# Patient Record
Sex: FEMALE | Race: WHITE | NOT HISPANIC OR LATINO | Employment: OTHER | URBAN - METROPOLITAN AREA
[De-identification: names, ages, dates, MRNs, and addresses within clinical notes are randomized per-mention and may not be internally consistent; named-entity substitution may affect disease eponyms.]

---

## 2017-01-16 RX ORDER — ANASTROZOLE 1 MG/1
TABLET ORAL
Qty: 30 TABLET | Refills: 2 | Status: SHIPPED | OUTPATIENT
Start: 2017-01-16 | End: 2017-04-12 | Stop reason: SDUPTHER

## 2017-02-02 DIAGNOSIS — M85.80 OSTEOPENIA: ICD-10-CM

## 2017-02-02 DIAGNOSIS — C50.411 MALIGNANT NEOPLASM OF UPPER-OUTER QUADRANT OF RIGHT FEMALE BREAST (HCC): ICD-10-CM

## 2017-02-06 ENCOUNTER — OFFICE VISIT (OUTPATIENT)
Dept: ONCOLOGY | Facility: CLINIC | Age: 67
End: 2017-02-06

## 2017-02-06 ENCOUNTER — LAB (OUTPATIENT)
Dept: LAB | Facility: HOSPITAL | Age: 67
End: 2017-02-06

## 2017-02-06 ENCOUNTER — INFUSION (OUTPATIENT)
Dept: ONCOLOGY | Facility: HOSPITAL | Age: 67
End: 2017-02-06

## 2017-02-06 ENCOUNTER — TRANSCRIBE ORDERS (OUTPATIENT)
Dept: ONCOLOGY | Facility: CLINIC | Age: 67
End: 2017-02-06

## 2017-02-06 VITALS
WEIGHT: 169.2 LBS | HEIGHT: 64 IN | SYSTOLIC BLOOD PRESSURE: 130 MMHG | RESPIRATION RATE: 16 BRPM | DIASTOLIC BLOOD PRESSURE: 70 MMHG | TEMPERATURE: 97.6 F | HEART RATE: 74 BPM | BODY MASS INDEX: 28.89 KG/M2

## 2017-02-06 DIAGNOSIS — C50.411 MALIGNANT NEOPLASM OF UPPER-OUTER QUADRANT OF RIGHT FEMALE BREAST (HCC): ICD-10-CM

## 2017-02-06 DIAGNOSIS — M85.80 OSTEOPENIA: Primary | ICD-10-CM

## 2017-02-06 DIAGNOSIS — C50.411 MALIGNANT NEOPLASM OF UPPER-OUTER QUADRANT OF RIGHT FEMALE BREAST (HCC): Primary | ICD-10-CM

## 2017-02-06 DIAGNOSIS — R23.2 HOT FLASHES: ICD-10-CM

## 2017-02-06 DIAGNOSIS — M85.80 OSTEOPENIA: ICD-10-CM

## 2017-02-06 DIAGNOSIS — D75.1 SECONDARY ERYTHROCYTOSIS: ICD-10-CM

## 2017-02-06 LAB
ALBUMIN SERPL-MCNC: 4.4 G/DL (ref 3.5–5.2)
ALBUMIN/GLOB SERPL: 1.5 G/DL (ref 1.1–2.4)
ALP SERPL-CCNC: 69 U/L (ref 38–116)
ALT SERPL W P-5'-P-CCNC: 17 U/L (ref 0–33)
ANION GAP SERPL CALCULATED.3IONS-SCNC: 9.5 MMOL/L
AST SERPL-CCNC: 17 U/L (ref 0–32)
BASOPHILS # BLD AUTO: 0.05 10*3/MM3 (ref 0–0.1)
BASOPHILS NFR BLD AUTO: 0.8 % (ref 0–1.1)
BILIRUB SERPL-MCNC: 0.4 MG/DL (ref 0.1–1.2)
BUN BLD-MCNC: 16 MG/DL (ref 6–20)
BUN/CREAT SERPL: 20 (ref 7.3–30)
CALCIUM SPEC-SCNC: 9.6 MG/DL (ref 8.5–10.2)
CHLORIDE SERPL-SCNC: 103 MMOL/L (ref 98–107)
CO2 SERPL-SCNC: 30.5 MMOL/L (ref 22–29)
CREAT BLD-MCNC: 0.8 MG/DL (ref 0.6–1.1)
DEPRECATED RDW RBC AUTO: 46.6 FL (ref 37–49)
EOSINOPHIL # BLD AUTO: 0.24 10*3/MM3 (ref 0–0.36)
EOSINOPHIL NFR BLD AUTO: 3.6 % (ref 1–5)
ERYTHROCYTE [DISTWIDTH] IN BLOOD BY AUTOMATED COUNT: 13.9 % (ref 11.7–14.5)
GFR SERPL CREATININE-BSD FRML MDRD: 72 ML/MIN/1.73
GLOBULIN UR ELPH-MCNC: 2.9 GM/DL (ref 1.8–3.5)
GLUCOSE BLD-MCNC: 121 MG/DL (ref 74–124)
HCT VFR BLD AUTO: 46.7 % (ref 34–45)
HGB BLD-MCNC: 14.2 G/DL (ref 11.5–14.9)
IMM GRANULOCYTES # BLD: 0.02 10*3/MM3 (ref 0–0.03)
IMM GRANULOCYTES NFR BLD: 0.3 % (ref 0–0.5)
LYMPHOCYTES # BLD AUTO: 1.77 10*3/MM3 (ref 1–3.5)
LYMPHOCYTES NFR BLD AUTO: 26.8 % (ref 20–49)
MAGNESIUM SERPL-MCNC: 2.1 MG/DL (ref 1.8–2.5)
MCH RBC QN AUTO: 27.5 PG (ref 27–33)
MCHC RBC AUTO-ENTMCNC: 30.4 G/DL (ref 32–35)
MCV RBC AUTO: 90.3 FL (ref 83–97)
MONOCYTES # BLD AUTO: 0.44 10*3/MM3 (ref 0.25–0.8)
MONOCYTES NFR BLD AUTO: 6.7 % (ref 4–12)
NEUTROPHILS # BLD AUTO: 4.09 10*3/MM3 (ref 1.5–7)
NEUTROPHILS NFR BLD AUTO: 61.8 % (ref 39–75)
NRBC BLD MANUAL-RTO: 0 /100 WBC (ref 0–0)
PHOSPHATE SERPL-MCNC: 3.3 MG/DL (ref 2.5–4.5)
PLATELET # BLD AUTO: 276 10*3/MM3 (ref 150–375)
PMV BLD AUTO: 9.1 FL (ref 8.9–12.1)
POTASSIUM BLD-SCNC: 4.5 MMOL/L (ref 3.5–4.7)
PROT SERPL-MCNC: 7.3 G/DL (ref 6.3–8)
RBC # BLD AUTO: 5.17 10*6/MM3 (ref 3.9–5)
SODIUM BLD-SCNC: 143 MMOL/L (ref 134–145)
WBC NRBC COR # BLD: 6.61 10*3/MM3 (ref 4–10)

## 2017-02-06 PROCEDURE — 36415 COLL VENOUS BLD VENIPUNCTURE: CPT

## 2017-02-06 PROCEDURE — 83735 ASSAY OF MAGNESIUM: CPT

## 2017-02-06 PROCEDURE — 85025 COMPLETE CBC W/AUTO DIFF WBC: CPT

## 2017-02-06 PROCEDURE — 25010000002 DENOSUMAB 60 MG/ML SOLUTION: Performed by: INTERNAL MEDICINE

## 2017-02-06 PROCEDURE — 80053 COMPREHEN METABOLIC PANEL: CPT

## 2017-02-06 PROCEDURE — 99213 OFFICE O/P EST LOW 20 MIN: CPT | Performed by: INTERNAL MEDICINE

## 2017-02-06 PROCEDURE — 84100 ASSAY OF PHOSPHORUS: CPT

## 2017-02-06 PROCEDURE — 96372 THER/PROPH/DIAG INJ SC/IM: CPT | Performed by: INTERNAL MEDICINE

## 2017-02-06 RX ORDER — ATORVASTATIN CALCIUM 80 MG/1
80 TABLET, FILM COATED ORAL DAILY
COMMUNITY
End: 2017-04-10 | Stop reason: SDUPTHER

## 2017-02-06 RX ADMIN — DENOSUMAB 60 MG: 60 INJECTION SUBCUTANEOUS at 10:25

## 2017-02-06 NOTE — PROGRESS NOTES
prolia supportive plan was inadvertently completed prior to being released- new order was entered per GEO Goel

## 2017-02-06 NOTE — PROGRESS NOTES
Subjective:     Reason for follow up:   1. Stage IA, multifocal invasive ductal carcinoma of the right breast; both estrogen receptor positive and progesterone receptor positive, HER-2/roya negative.    * Status post lumpectomy on 11/13/2014 with negative sentinel lymph nodes, status post adjuvant radiation therapy, completed in March 2015.    * Adjuvant Arimidex initiated March 2015, to be continued for 5 years. .   2. Polycythemia, secondary.   3. Osteopenia, on Fosamax initiated April 2015     History of Present Ilness:  Senait Moore presents for follow-up of breast cancer.  She was started on Prolia six months ago and is glad to be doing that instead of an oral bisphosphonate. She has hot flashes from the Arimidex but feels like they're tolerable. She does self breast exams and hasn't noticed any abnormalities. No fevers, chills, night sweats, nausea, vomiting, diarrhea, and generally is doing quite well.      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[employer]     Social History Main Topics   • Smoking status: Former Smoker     Packs/day: 1.00     Years: 42.00     Types: Cigarettes     Quit date: 1/4/2015   • Smokeless tobacco: Never Used   • Alcohol use No      Comment: Significant H/O past alcohol use, but no current use   • Drug use: No   • Sexual activity: Not on file     Other Topics Concern   • Not on file     Social History Narrative     Family History   Family History   Problem Relation Age of Onset   • Heart disease Mother    • Hypertension  "Mother    • Diabetes Mother    • Colon cancer Mother 60   • Pancreatic cancer Mother    • No Known Problems Father    • No Known Problems Sister    • No Known Problems Brother    • No Known Problems Daughter    • No Known Problems Son    • No Known Problems Maternal Grandmother    • No Known Problems Paternal Grandmother    • No Known Problems Maternal Aunt    • No Known Problems Paternal Aunt    • BRCA 1/2 Neg Hx    • Breast cancer Neg Hx    • Endometrial cancer Neg Hx    • Ovarian cancer Neg Hx      Allergies No Known Allergies    Medications: The current medication list was reviewed in the EMR.    Review of Systems  GENERAL: No change in appetite or weight; No fevers, chills, sweats.  Hot flashes  SKIN: No nonhealing lesions. No rashes.  HEME/LYMPH: No easy bruising, bleeding. No swollen nodes.   EYES: No vision changes or diplopia.   ENT: No tinnitus, hearing loss, gum bleeding, epistaxis, hoarseness or dysphagia.   RESPIRATORY: No cough, shortness of breath, hemoptysis or wheezing.   CVS: No chest pain, palpitations, orthopnea, dyspnea on exertion or PND.   PSYCHIATRIC: No increased nervousness, mood changes or depression.        Objective:     Vitals:    02/06/17 0943   BP: 130/70   Pulse: 74   Resp: 16   Temp: 97.6 °F (36.4 °C)   Weight: 169 lb 3.2 oz (76.7 kg)   Height: 63.78\" (162 cm)  Comment: new ht. without shoes   PainSc: 0-No pain     Current Status 2/6/2017   ECOG score 0     GENERAL:  Well-developed, well-nourished female in no acute distress.   LYMPHATICS:  No cervical, supraclavicular, axillary adenopathy.  CHEST:  Lungs clear to percussion and auscultation. Good airflow.  CARDIAC:  Regular rate and rhythm without murmurs, rubs or gallops. Normal S1,S2. No edema  EXTREMITIES:  No clubbing, cyanosis or edema.  PSYCHIATRIC:  Normal affect and mood.    BREASTS: No palpable masses, skin changes, nipple discharge, axillary or supraclavicular lymphadenopathy bilaterally.     Labs and Imaging  Lab Results "   Component Value Date    WBC 6.61 02/06/2017    HGB 14.2 02/06/2017    HCT 46.7 (H) 02/06/2017    MCV 90.3 02/06/2017     02/06/2017       Breast imaging: None     Assessment/Plan     Assessment:   1. Stage IA ([id number]) multifocal invasive ductal carcinoma, strongly estrogen-receptor positive, progesterone-receptor positive and HER-2/roya negative measuring 1.3 x 1.1 cm with negative sentinel lymph nodes.    * Patient is status post lumpectomy and adjuvant radiation therapy.   * The patient was initiated on Arimidex in March 2015. Tolerates well. Has hot flashes, but tolerable.   2. Osteopenia based on DEXA scan. Prolia.  3. Secondary erythrocytosis, JAK2 negative. Resolved with smoking cessation  4. Hot flashes due to Arimidex.      Plan:     1. Continue anastrazole.  2. Annual mammogram due August.  3. DEXA scan due June 2017.  4. Prolia every 6 months. Due today.  5. CBC in 6 months.  6. Patient was instructed on the importance of physical activity, healthy diet, and self breast exams.  Patient will continue calcium and vitamin D supplementation.    Follow-up in 6 months. I asked the patient to call for any questions, concerns, or new symptoms.

## 2017-04-10 ENCOUNTER — OFFICE VISIT (OUTPATIENT)
Dept: FAMILY MEDICINE CLINIC | Facility: CLINIC | Age: 67
End: 2017-04-10

## 2017-04-10 VITALS
BODY MASS INDEX: 28.85 KG/M2 | DIASTOLIC BLOOD PRESSURE: 78 MMHG | WEIGHT: 169 LBS | RESPIRATION RATE: 18 BRPM | OXYGEN SATURATION: 93 % | HEART RATE: 72 BPM | HEIGHT: 64 IN | SYSTOLIC BLOOD PRESSURE: 144 MMHG

## 2017-04-10 DIAGNOSIS — Z00.00 HEALTHCARE MAINTENANCE: ICD-10-CM

## 2017-04-10 DIAGNOSIS — J43.8 OTHER EMPHYSEMA (HCC): ICD-10-CM

## 2017-04-10 DIAGNOSIS — I10 BENIGN ESSENTIAL HYPERTENSION: Primary | ICD-10-CM

## 2017-04-10 DIAGNOSIS — E78.00 HYPERCHOLESTEROLEMIA: ICD-10-CM

## 2017-04-10 PROCEDURE — 99212 OFFICE O/P EST SF 10 MIN: CPT | Performed by: NURSE PRACTITIONER

## 2017-04-10 RX ORDER — HYDROCHLOROTHIAZIDE 25 MG/1
25 TABLET ORAL DAILY
Qty: 30 TABLET | Refills: 6 | Status: SHIPPED | OUTPATIENT
Start: 2017-04-10 | End: 2017-11-04 | Stop reason: SDUPTHER

## 2017-04-10 RX ORDER — ATORVASTATIN CALCIUM 80 MG/1
80 TABLET, FILM COATED ORAL DAILY
Qty: 30 TABLET | Refills: 6 | Status: SHIPPED | OUTPATIENT
Start: 2017-04-10 | End: 2017-09-14

## 2017-04-10 NOTE — PROGRESS NOTES
Subjective   Senait Moore is a 66 y.o. female.   Chief Complaint   Patient presents with   • Hypertension     new pt transfer from [place], needs refills.      Vitals:    04/10/17 0759   BP: 144/78   Pulse: 72   Resp: 18   SpO2: 93%     No Known Allergies    HPI Comments: Mrs. Moore came in today as a transfer from another office. She needs medication refills for her hypercholesterolemia, hypertension, and COPD. She has no complaints at this time and is planning on coming in for a physical in September. Her BP is slightly elevated today but states she forgot to take her medicine this morning.        The following portions of the patient's history were reviewed and updated as appropriate: allergies, current medications, past family history, past medical history, past social history, past surgical history and problem list.    Review of Systems   Constitutional: Negative for fatigue and fever.   Respiratory: Positive for shortness of breath (not taking enough dulera) and wheezing (not taking enough dulera). Negative for cough and chest tightness.    Cardiovascular: Negative for chest pain, palpitations and leg swelling.   All other systems reviewed and are negative.      Objective   Physical Exam   Constitutional: She is oriented to person, place, and time. She appears well-developed and well-nourished.   HENT:   Head: Normocephalic and atraumatic.   Cardiovascular: Normal rate.    Pulmonary/Chest: Effort normal.   Neurological: She is alert and oriented to person, place, and time.   Psychiatric: She has a normal mood and affect. Her behavior is normal. Judgment and thought content normal.   Nursing note and vitals reviewed.      Assessment/Plan   Problems Addressed this Visit        Cardiovascular and Mediastinum    Benign essential hypertension - Primary    Relevant Medications    hydrochlorothiazide (HYDRODIURIL) 25 MG tablet    Hypercholesterolemia    Relevant Medications    atorvastatin (LIPITOR) 80 MG  tablet       Respiratory    Pulmonary emphysema    Relevant Medications    mometasone-formoterol (DULERA) 200-5 MCG/ACT inhaler      Other Visit Diagnoses     Healthcare maintenance        Relevant Orders    CBC Auto Differential    Comprehensive metabolic panel    Lipid panel    TSH

## 2017-04-13 RX ORDER — ANASTROZOLE 1 MG/1
TABLET ORAL
Qty: 30 TABLET | Refills: 1 | Status: SHIPPED | OUTPATIENT
Start: 2017-04-13 | End: 2017-06-16 | Stop reason: SDUPTHER

## 2017-06-06 ENCOUNTER — HOSPITAL ENCOUNTER (OUTPATIENT)
Dept: BONE DENSITY | Facility: HOSPITAL | Age: 67
Discharge: HOME | End: 2017-06-06
Attending: INTERNAL MEDICINE | Admitting: INTERNAL MEDICINE

## 2017-06-06 DIAGNOSIS — M85.80 OSTEOPENIA: ICD-10-CM

## 2017-06-06 PROCEDURE — 77080 DXA BONE DENSITY AXIAL: CPT

## 2017-06-12 ENCOUNTER — TELEPHONE (OUTPATIENT)
Dept: ONCOLOGY | Facility: CLINIC | Age: 67
End: 2017-06-12

## 2017-06-12 NOTE — TELEPHONE ENCOUNTER
Spoke with patient.  V/U.     ----- Message from Radha Iglesias MD sent at 6/9/2017  8:50 AM EDT -----  Please let patient know her DEXA is slightly improved from her last on in 2015. Plan to continue Prolia. Thanks, Radha

## 2017-06-16 RX ORDER — ANASTROZOLE 1 MG/1
TABLET ORAL
Qty: 30 TABLET | Refills: 0 | Status: SHIPPED | OUTPATIENT
Start: 2017-06-16 | End: 2017-07-17 | Stop reason: SDUPTHER

## 2017-07-18 RX ORDER — ANASTROZOLE 1 MG/1
TABLET ORAL
Qty: 30 TABLET | Refills: 0 | Status: SHIPPED | OUTPATIENT
Start: 2017-07-18 | End: 2017-08-14 | Stop reason: SDUPTHER

## 2017-08-06 NOTE — PROGRESS NOTES
Subjective:     Reason for follow up:   1. Stage IA, multifocal invasive ductal carcinoma of the right breast; both estrogen receptor positive and progesterone receptor positive, HER-2/roya negative.    * Status post lumpectomy on 11/13/2014 with negative sentinel lymph nodes, status post adjuvant radiation therapy, completed in March 2015.    * Adjuvant Arimidex initiated March 2015, to be continued for 5 years. .   2. Polycythemia, secondary.   3. Osteopenia, on Fosamax initiated April 2015     History of Present Ilness:  Senait Moore presents for follow-up of breast cancer.  She remains on Prolia and Arimidex.  She had bone density scan done recently that showed osteopenia.  She denies any fevers, chills, night sweats.  She is due for her mammogram.  She is uncertain if she wants to continue Prolia after this shot because of the high cost which is about $900 per year.  She continues to struggle with hot flashes because of the Arimidex.    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[employer]     Social History Main Topics   • Smoking status: Former Smoker     Packs/day: 1.00     Years: 42.00     Types: Cigarettes     Quit date: 1/4/2015   • Smokeless tobacco: Never Used   • Alcohol use No      Comment: Significant H/O past alcohol use, but no current use   • Drug use: No   • Sexual activity: Not on file     Other Topics Concern   • Not on file     Social History Narrative     Family  "History   Family History   Problem Relation Age of Onset   • Heart disease Mother    • Hypertension Mother    • Diabetes Mother    • Colon cancer Mother 60   • Pancreatic cancer Mother    • Lung disease Father    • COPD Father    • Emphysema Father    • No Known Problems Sister    • No Known Problems Brother    • No Known Problems Daughter    • No Known Problems Son    • Heart attack Maternal Grandmother    • No Known Problems Paternal Grandmother    • No Known Problems Maternal Aunt    • No Known Problems Paternal Aunt    • Heart attack Maternal Grandfather    • BRCA 1/2 Neg Hx    • Breast cancer Neg Hx    • Endometrial cancer Neg Hx    • Ovarian cancer Neg Hx      Allergies No Known Allergies    Medications: The current medication list was reviewed in the EMR.    Review of Systems  GENERAL: No change in appetite or weight; No fevers, chills, sweats.  Hot flashes   SKIN: No nonhealing lesions. No rashes.  HEME/LYMPH: No easy bruising, bleeding. No swollen nodes.   EYES: No vision changes or diplopia.   ENT: No tinnitus, hearing loss, gum bleeding, epistaxis, hoarseness or dysphagia.   RESPIRATORY: No cough, shortness of breath, hemoptysis or wheezing.   CVS: No chest pain, palpitations, orthopnea, dyspnea on exertion or PND.   PSYCHIATRIC: No increased nervousness, mood changes or depression.        Objective:     Vitals:    08/07/17 1118   BP: 130/70   Pulse: 80   Resp: 18   Temp: 98 °F (36.7 °C)   TempSrc: Oral   SpO2: 96%   Weight: 174 lb (78.9 kg)   Height: 64.65\" (164.2 cm)  Comment: new ht   PainSc: 0-No pain     Current Status 8/7/2017   ECOG score 0     GENERAL:  Well-developed, well-nourished female in no acute distress.   LYMPHATICS:  No cervical, supraclavicular, axillary adenopathy.  CHEST:  Lungs clear to percussion and auscultation. Good airflow.  CARDIAC:  Regular rate and rhythm without murmurs, rubs or gallops. Normal S1,S2. No edema  EXTREMITIES:  No clubbing, cyanosis or edema.  PSYCHIATRIC:  " Normal affect and mood.    BREASTS: No palpable masses, skin changes, nipple discharge, axillary or supraclavicular lymphadenopathy bilaterally. Right breast smaller than left.      Labs and Imaging  Lab Results   Component Value Date    WBC 6.53 08/07/2017    HGB 14.5 08/07/2017    HCT 45.7 (H) 08/07/2017    MCV 88.9 08/07/2017     08/07/2017       Breast imaging: None     DEXA 6/2017:  FINDINGS: The bone mineral density in the lumbar spine has a T value of  -1.7 representing moderate osteopenia. The bone mineral density the left  femoral neck has a T value -1.2 representing mild osteopenia.    Assessment/Plan     Assessment:   1. Stage IA ([id number]) multifocal invasive ductal carcinoma, strongly estrogen-receptor positive, progesterone-receptor positive and HER-2/roya negative measuring 1.3 x 1.1 cm with negative sentinel lymph nodes.    * Patient is status post lumpectomy and adjuvant radiation therapy.   * Initiated on Arimidex in March 2015. Tolerates well. Has hot flashes, but tolerable.    * currently no evidence of disease.   2. Osteopenia based on DEXA scan. Improving. Prolia.  3. Secondary erythrocytosis, JAK2 negative. Resolved with smoking cessation  4. Hot flashes due to Arimidex.      Plan:     1. Continue anastrazole.  2. Annual mammogram due August - ordered today  3. DEXA scan due June 2019.  4. Prolia every 6 months. Due today. She's uncertain if she wants to continue Prolia after this injection because of the high costs. Will discuss at future visit.   5.  Patient was instructed on the importance of physical activity, healthy diet, and self breast exams.  Patient will continue calcium and vitamin D supplementation.    Follow-up in 6 months. I asked the patient to call for any questions, concerns, or new symptoms.

## 2017-08-07 ENCOUNTER — OFFICE VISIT (OUTPATIENT)
Dept: ONCOLOGY | Facility: CLINIC | Age: 67
End: 2017-08-07

## 2017-08-07 ENCOUNTER — LAB (OUTPATIENT)
Dept: LAB | Facility: HOSPITAL | Age: 67
End: 2017-08-07

## 2017-08-07 ENCOUNTER — INFUSION (OUTPATIENT)
Dept: ONCOLOGY | Facility: HOSPITAL | Age: 67
End: 2017-08-07

## 2017-08-07 VITALS
BODY MASS INDEX: 28.99 KG/M2 | SYSTOLIC BLOOD PRESSURE: 130 MMHG | WEIGHT: 174 LBS | RESPIRATION RATE: 18 BRPM | OXYGEN SATURATION: 96 % | HEART RATE: 80 BPM | TEMPERATURE: 98 F | DIASTOLIC BLOOD PRESSURE: 70 MMHG | HEIGHT: 65 IN

## 2017-08-07 DIAGNOSIS — C50.411 MALIGNANT NEOPLASM OF UPPER-OUTER QUADRANT OF RIGHT FEMALE BREAST (HCC): ICD-10-CM

## 2017-08-07 DIAGNOSIS — M85.80 OSTEOPENIA: Primary | ICD-10-CM

## 2017-08-07 DIAGNOSIS — M85.80 OSTEOPENIA: ICD-10-CM

## 2017-08-07 DIAGNOSIS — C50.411 MALIGNANT NEOPLASM OF UPPER-OUTER QUADRANT OF RIGHT FEMALE BREAST (HCC): Primary | ICD-10-CM

## 2017-08-07 LAB
ALBUMIN SERPL-MCNC: 4 G/DL (ref 3.5–5.2)
ALBUMIN/GLOB SERPL: 1.3 G/DL (ref 1.1–2.4)
ALP SERPL-CCNC: 77 U/L (ref 38–116)
ALT SERPL W P-5'-P-CCNC: 17 U/L (ref 0–33)
ANION GAP SERPL CALCULATED.3IONS-SCNC: 12.9 MMOL/L
AST SERPL-CCNC: 16 U/L (ref 0–32)
BASOPHILS # BLD AUTO: 0.03 10*3/MM3 (ref 0–0.1)
BASOPHILS NFR BLD AUTO: 0.5 % (ref 0–1.1)
BILIRUB SERPL-MCNC: 0.3 MG/DL (ref 0.1–1.2)
BUN BLD-MCNC: 16 MG/DL (ref 6–20)
BUN/CREAT SERPL: 21.6 (ref 7.3–30)
CALCIUM SPEC-SCNC: 9.4 MG/DL (ref 8.5–10.2)
CHLORIDE SERPL-SCNC: 100 MMOL/L (ref 98–107)
CO2 SERPL-SCNC: 29.1 MMOL/L (ref 22–29)
CREAT BLD-MCNC: 0.74 MG/DL (ref 0.6–1.1)
DEPRECATED RDW RBC AUTO: 46.2 FL (ref 37–49)
EOSINOPHIL # BLD AUTO: 0.2 10*3/MM3 (ref 0–0.36)
EOSINOPHIL NFR BLD AUTO: 3.1 % (ref 1–5)
ERYTHROCYTE [DISTWIDTH] IN BLOOD BY AUTOMATED COUNT: 14.3 % (ref 11.7–14.5)
GFR SERPL CREATININE-BSD FRML MDRD: 79 ML/MIN/1.73
GLOBULIN UR ELPH-MCNC: 3.1 GM/DL (ref 1.8–3.5)
GLUCOSE BLD-MCNC: 133 MG/DL (ref 74–124)
HCT VFR BLD AUTO: 45.7 % (ref 34–45)
HGB BLD-MCNC: 14.5 G/DL (ref 11.5–14.9)
HOLD SPECIMEN: NORMAL
IMM GRANULOCYTES # BLD: 0.03 10*3/MM3 (ref 0–0.03)
IMM GRANULOCYTES NFR BLD: 0.5 % (ref 0–0.5)
LYMPHOCYTES # BLD AUTO: 1.65 10*3/MM3 (ref 1–3.5)
LYMPHOCYTES NFR BLD AUTO: 25.3 % (ref 20–49)
MAGNESIUM SERPL-MCNC: 1.9 MG/DL (ref 1.8–2.5)
MCH RBC QN AUTO: 28.2 PG (ref 27–33)
MCHC RBC AUTO-ENTMCNC: 31.7 G/DL (ref 32–35)
MCV RBC AUTO: 88.9 FL (ref 83–97)
MONOCYTES # BLD AUTO: 0.37 10*3/MM3 (ref 0.25–0.8)
MONOCYTES NFR BLD AUTO: 5.7 % (ref 4–12)
NEUTROPHILS # BLD AUTO: 4.25 10*3/MM3 (ref 1.5–7)
NEUTROPHILS NFR BLD AUTO: 64.9 % (ref 39–75)
NRBC BLD MANUAL-RTO: 0 /100 WBC (ref 0–0)
PHOSPHATE SERPL-MCNC: 3.3 MG/DL (ref 2.5–4.5)
PLATELET # BLD AUTO: 266 10*3/MM3 (ref 150–375)
PMV BLD AUTO: 8.9 FL (ref 8.9–12.1)
POTASSIUM BLD-SCNC: 4.1 MMOL/L (ref 3.5–4.7)
PROT SERPL-MCNC: 7.1 G/DL (ref 6.3–8)
RBC # BLD AUTO: 5.14 10*6/MM3 (ref 3.9–5)
SODIUM BLD-SCNC: 142 MMOL/L (ref 134–145)
WBC NRBC COR # BLD: 6.53 10*3/MM3 (ref 4–10)

## 2017-08-07 PROCEDURE — 99213 OFFICE O/P EST LOW 20 MIN: CPT | Performed by: INTERNAL MEDICINE

## 2017-08-07 PROCEDURE — 96372 THER/PROPH/DIAG INJ SC/IM: CPT | Performed by: INTERNAL MEDICINE

## 2017-08-07 PROCEDURE — 36415 COLL VENOUS BLD VENIPUNCTURE: CPT

## 2017-08-07 PROCEDURE — 25010000002 DENOSUMAB 60 MG/ML SOLUTION: Performed by: INTERNAL MEDICINE

## 2017-08-07 PROCEDURE — 83735 ASSAY OF MAGNESIUM: CPT

## 2017-08-07 PROCEDURE — 84100 ASSAY OF PHOSPHORUS: CPT

## 2017-08-07 PROCEDURE — 80053 COMPREHEN METABOLIC PANEL: CPT

## 2017-08-07 PROCEDURE — 85025 COMPLETE CBC W/AUTO DIFF WBC: CPT

## 2017-08-07 RX ADMIN — DENOSUMAB 60 MG: 60 INJECTION SUBCUTANEOUS at 11:59

## 2017-08-15 ENCOUNTER — RESULTS ENCOUNTER (OUTPATIENT)
Dept: FAMILY MEDICINE CLINIC | Facility: CLINIC | Age: 67
End: 2017-08-15

## 2017-08-15 DIAGNOSIS — Z00.00 HEALTHCARE MAINTENANCE: ICD-10-CM

## 2017-08-15 RX ORDER — ANASTROZOLE 1 MG/1
TABLET ORAL
Qty: 30 TABLET | Refills: 2 | Status: SHIPPED | OUTPATIENT
Start: 2017-08-15 | End: 2017-11-04 | Stop reason: SDUPTHER

## 2017-08-28 ENCOUNTER — HOSPITAL ENCOUNTER (OUTPATIENT)
Dept: MAMMOGRAPHY | Facility: HOSPITAL | Age: 67
Discharge: HOME | End: 2017-08-28
Attending: INTERNAL MEDICINE | Admitting: INTERNAL MEDICINE

## 2017-08-28 DIAGNOSIS — C50.411 MALIGNANT NEOPLASM OF UPPER-OUTER QUADRANT OF RIGHT FEMALE BREAST (HCC): ICD-10-CM

## 2017-08-28 PROCEDURE — G0202 SCR MAMMO BI INCL CAD: HCPCS

## 2017-09-05 LAB
ALBUMIN SERPL-MCNC: 4.3 G/DL (ref 3.5–5.2)
ALBUMIN/GLOB SERPL: 1.7 G/DL
ALP SERPL-CCNC: 82 U/L (ref 40–129)
ALT SERPL-CCNC: 27 U/L (ref 5–33)
AST SERPL-CCNC: 22 U/L (ref 5–32)
BASOPHILS # BLD AUTO: 0.05 10*3/MM3 (ref 0–0.2)
BASOPHILS NFR BLD AUTO: 0.7 % (ref 0–2)
BILIRUB SERPL-MCNC: 0.3 MG/DL (ref 0.2–1.2)
BUN SERPL-MCNC: 18 MG/DL (ref 8–23)
BUN/CREAT SERPL: 22.5 (ref 7–25)
CALCIUM SERPL-MCNC: 8.6 MG/DL (ref 8.8–10.5)
CHLORIDE SERPL-SCNC: 99 MMOL/L (ref 98–107)
CHOLEST SERPL-MCNC: 157 MG/DL (ref 0–200)
CO2 SERPL-SCNC: 30.8 MMOL/L (ref 22–29)
CREAT SERPL-MCNC: 0.8 MG/DL (ref 0.57–1)
EOSINOPHIL # BLD AUTO: 0.27 10*3/MM3 (ref 0.1–0.3)
EOSINOPHIL NFR BLD AUTO: 3.8 % (ref 0–4)
ERYTHROCYTE [DISTWIDTH] IN BLOOD BY AUTOMATED COUNT: 14.3 % (ref 11.5–14.5)
GLOBULIN SER CALC-MCNC: 2.6 GM/DL
GLUCOSE SERPL-MCNC: 103 MG/DL (ref 65–99)
HCT VFR BLD AUTO: 45.5 % (ref 37–47)
HDLC SERPL-MCNC: 41 MG/DL (ref 40–60)
HGB BLD-MCNC: 14.5 G/DL (ref 12–16)
IMM GRANULOCYTES # BLD: 0.03 10*3/MM3 (ref 0–0.03)
IMM GRANULOCYTES NFR BLD: 0.4 % (ref 0–0.5)
LDLC SERPL CALC-MCNC: 99 MG/DL (ref 0–100)
LYMPHOCYTES # BLD AUTO: 1.73 10*3/MM3 (ref 0.6–4.8)
LYMPHOCYTES NFR BLD AUTO: 24.4 % (ref 20–45)
MCH RBC QN AUTO: 28.2 PG (ref 27–31)
MCHC RBC AUTO-ENTMCNC: 31.9 G/DL (ref 31–37)
MCV RBC AUTO: 88.3 FL (ref 81–99)
MONOCYTES # BLD AUTO: 0.53 10*3/MM3 (ref 0–1)
MONOCYTES NFR BLD AUTO: 7.5 % (ref 3–8)
NEUTROPHILS # BLD AUTO: 4.49 10*3/MM3 (ref 1.5–8.3)
NEUTROPHILS NFR BLD AUTO: 63.2 % (ref 45–70)
NRBC BLD AUTO-RTO: 0 /100 WBC (ref 0–0)
PLATELET # BLD AUTO: 296 10*3/MM3 (ref 140–500)
POTASSIUM SERPL-SCNC: 3.9 MMOL/L (ref 3.5–5.2)
PROT SERPL-MCNC: 6.9 G/DL (ref 6–8.5)
RBC # BLD AUTO: 5.15 10*6/MM3 (ref 4.2–5.4)
SODIUM SERPL-SCNC: 141 MMOL/L (ref 136–145)
TRIGL SERPL-MCNC: 87 MG/DL (ref 0–150)
TSH SERPL DL<=0.005 MIU/L-ACNC: 0.56 MIU/ML (ref 0.27–4.2)
VLDLC SERPL CALC-MCNC: 17.4 MG/DL (ref 7–27)
WBC # BLD AUTO: 7.1 10*3/MM3 (ref 4.8–10.8)

## 2017-09-14 ENCOUNTER — OFFICE VISIT (OUTPATIENT)
Dept: FAMILY MEDICINE CLINIC | Facility: CLINIC | Age: 67
End: 2017-09-14

## 2017-09-14 VITALS
RESPIRATION RATE: 20 BRPM | HEIGHT: 65 IN | SYSTOLIC BLOOD PRESSURE: 122 MMHG | DIASTOLIC BLOOD PRESSURE: 74 MMHG | OXYGEN SATURATION: 95 % | BODY MASS INDEX: 28.32 KG/M2 | WEIGHT: 170 LBS | HEART RATE: 72 BPM

## 2017-09-14 DIAGNOSIS — R06.09 DYSPNEA ON EXERTION: ICD-10-CM

## 2017-09-14 DIAGNOSIS — T78.2XXA EXERCISE INDUCED ANAPHYLAXIS, INITIAL ENCOUNTER: Primary | ICD-10-CM

## 2017-09-14 DIAGNOSIS — E78.00 HYPERCHOLESTEROLEMIA: ICD-10-CM
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
## 2017-09-14 PROCEDURE — 99397 PER PM REEVAL EST PAT 65+ YR: CPT | Performed by: NURSE PRACTITIONER

## 2017-09-14 RX ORDER — ALBUTEROL SULFATE 90 UG/1
2 AEROSOL, METERED RESPIRATORY (INHALATION) EVERY 4 HOURS PRN
Qty: 1 INHALER | Refills: 1 | Status: SHIPPED | OUTPATIENT
Start: 2017-09-14 | End: 2018-10-08 | Stop reason: SDUPTHER

## 2017-09-14 RX ORDER — ATORVASTATIN CALCIUM 40 MG/1
40 TABLET, FILM COATED ORAL DAILY
Qty: 30 TABLET | Refills: 3 | Status: SHIPPED | OUTPATIENT
Start: 2017-09-14 | End: 2018-01-09 | Stop reason: SDUPTHER

## 2017-09-14 NOTE — PROGRESS NOTES
Subjective   Senait Moore is a 67 y.o. female.   Chief Complaint   Patient presents with   • Annual Exam     pt here for annual exam, would like to discuss cholesterol meds, and follow up on labs     Vitals:    09/14/17 0758   BP: 122/74   Pulse: 72   Resp: 20   SpO2: 95%     No Known Allergies    HPI Comments: Reading a book on healthier diet to improve diabetes. Eating less sugar. Has been working on healthier diet and trying to exercise more. Does get short of breath with exercise sometimes and would like an inhaler to help. She is a former smoker.  Occasionally feels a little light headed. Not every day- will last a few minutes. Takes zyrtec. Has not lost her balance or fainted. Dizziness sometimes with position changes.        The following portions of the patient's history were reviewed and updated as appropriate: allergies, current medications, past family history, past medical history, past social history, past surgical history and problem list.    Review of Systems   Constitutional: Negative.    HENT: Negative for tinnitus.    Respiratory: Negative.    Cardiovascular: Negative.    Gastrointestinal: Negative.    Endocrine: Negative.    Genitourinary: Negative.    Musculoskeletal: Positive for arthralgias, back pain and myalgias.   Neurological: Positive for light-headedness. Negative for weakness and numbness.   Psychiatric/Behavioral: Negative.    All other systems reviewed and are negative.      Objective   Physical Exam   Constitutional: She is oriented to person, place, and time. She appears well-developed and well-nourished.   HENT:   Head: Normocephalic and atraumatic.   Right Ear: External ear normal.   Left Ear: External ear normal.   Mouth/Throat: Oropharynx is clear and moist.   Neck: Normal range of motion. Neck supple.   Cardiovascular: Normal rate, regular rhythm, normal heart sounds and intact distal pulses.    No murmur heard.  Pulmonary/Chest: Effort normal and breath sounds normal.    Musculoskeletal: Normal range of motion.   Neurological: She is alert and oriented to person, place, and time.   Skin: Skin is warm and dry.   Psychiatric: She has a normal mood and affect. Her behavior is normal. Judgment and thought content normal.   Nursing note and vitals reviewed.      Assessment/Plan   Problems Addressed this Visit        Cardiovascular and Mediastinum    Hypercholesterolemia    Relevant Medications    atorvastatin (LIPITOR) 40 MG tablet      Other Visit Diagnoses     Exercise induced anaphylaxis, initial encounter    -  Primary    Dyspnea on exertion        Relevant Medications    albuterol (PROVENTIL HFA;VENTOLIN HFA) 108 (90 Base) MCG/ACT inhaler        Discussed lab results. She has not been taking her calcium supplement but will resume today. She got her mammogram last month and a colonoscopy last year. She will get another colonoscopy in 4 years due to several polyps that were found on last exam.           No visits with results within 1 Week(s) from this visit.  Latest known visit with results is:    Results Encounter on 08/15/2017   Component Date Value Ref Range Status   • Glucose 09/05/2017 103* 65 - 99 mg/dL Final   • BUN 09/05/2017 18  8 - 23 mg/dL Final   • Creatinine 09/05/2017 0.80  0.57 - 1.00 mg/dL Final   • eGFR Non  Am 09/05/2017 72  >60 mL/min/1.73 Final   • eGFR African Am 09/05/2017 87  >60 mL/min/1.73 Final   • BUN/Creatinine Ratio 09/05/2017 22.5  7.0 - 25.0 Final   • Sodium 09/05/2017 141  136 - 145 mmol/L Final   • Potassium 09/05/2017 3.9  3.5 - 5.2 mmol/L Final   • Chloride 09/05/2017 99  98 - 107 mmol/L Final   • Total CO2 09/05/2017 30.8* 22.0 - 29.0 mmol/L Final   • Calcium 09/05/2017 8.6* 8.8 - 10.5 mg/dL Final   • Total Protein 09/05/2017 6.9  6.0 - 8.5 g/dL Final   • Albumin 09/05/2017 4.30  3.50 - 5.20 g/dL Final   • Globulin 09/05/2017 2.6  gm/dL Final   • A/G Ratio 09/05/2017 1.7  g/dL Final   • Total Bilirubin 09/05/2017 0.3  0.2 - 1.2 mg/dL Final    • Alkaline Phosphatase 09/05/2017 82  40 - 129 U/L Final   • AST (SGOT) 09/05/2017 22  5 - 32 U/L Final   • ALT (SGPT) 09/05/2017 27  5 - 33 U/L Final   • Total Cholesterol 09/05/2017 157  0 - 200 mg/dL Final   • Triglycerides 09/05/2017 87  0 - 150 mg/dL Final   • HDL Cholesterol 09/05/2017 41  40 - 60 mg/dL Final   • VLDL Cholesterol 09/05/2017 17.4  7 - 27 mg/dL Final   • LDL Cholesterol  09/05/2017 99  0 - 100 mg/dL Final   • TSH 09/05/2017 0.565  0.270 - 4.200 mIU/mL Final   • WBC 09/05/2017 7.10  4.80 - 10.80 10*3/mm3 Final   • RBC 09/05/2017 5.15  4.20 - 5.40 10*6/mm3 Final   • Hemoglobin 09/05/2017 14.5  12.0 - 16.0 g/dL Final   • Hematocrit 09/05/2017 45.5  37.0 - 47.0 % Final   • MCV 09/05/2017 88.3  81.0 - 99.0 fL Final   • MCH 09/05/2017 28.2  27.0 - 31.0 pg Final   • MCHC 09/05/2017 31.9  31.0 - 37.0 g/dL Final   • RDW 09/05/2017 14.3  11.5 - 14.5 % Final   • Platelets 09/05/2017 296  140 - 500 10*3/mm3 Final   • Neutrophil Rel % 09/05/2017 63.2  45.0 - 70.0 % Final   • Lymphocyte Rel % 09/05/2017 24.4  20.0 - 45.0 % Final   • Monocyte Rel % 09/05/2017 7.5  3.0 - 8.0 % Final   • Eosinophil Rel % 09/05/2017 3.8  0.0 - 4.0 % Final   • Basophil Rel % 09/05/2017 0.7  0.0 - 2.0 % Final   • Neutrophils Absolute 09/05/2017 4.49  1.50 - 8.30 10*3/mm3 Final   • Lymphocytes Absolute 09/05/2017 1.73  0.60 - 4.80 10*3/mm3 Final   • Monocytes Absolute 09/05/2017 0.53  0.00 - 1.00 10*3/mm3 Final   • Eosinophils Absolute 09/05/2017 0.27  0.10 - 0.30 10*3/mm3 Final   • Basophils Absolute 09/05/2017 0.05  0.00 - 0.20 10*3/mm3 Final   • Immature Granulocyte Rel % 09/05/2017 0.4  0.0 - 0.5 % Final   • Immature Grans Absolute 09/05/2017 0.03  0.00 - 0.03 10*3/mm3 Final   • nRBC 09/05/2017 0.0  0.0 - 0.0 /100 WBC Final

## 2017-11-04 DIAGNOSIS — I10 BENIGN ESSENTIAL HYPERTENSION: ICD-10-CM

## 2017-11-06 RX ORDER — ANASTROZOLE 1 MG/1
TABLET ORAL
Qty: 24 TABLET | Refills: 1 | Status: SHIPPED | OUTPATIENT
Start: 2017-11-06 | End: 2018-01-09 | Stop reason: SDUPTHER

## 2017-11-06 RX ORDER — HYDROCHLOROTHIAZIDE 25 MG/1
TABLET ORAL
Qty: 24 TABLET | Refills: 5 | Status: SHIPPED | OUTPATIENT
Start: 2017-11-06 | End: 2018-06-15 | Stop reason: SDUPTHER

## 2018-01-09 DIAGNOSIS — J43.8 OTHER EMPHYSEMA (HCC): ICD-10-CM

## 2018-01-09 DIAGNOSIS — E78.00 HYPERCHOLESTEROLEMIA: ICD-10-CM

## 2018-01-09 RX ORDER — ANASTROZOLE 1 MG/1
TABLET ORAL
Qty: 30 TABLET | Refills: 3 | Status: SHIPPED | OUTPATIENT
Start: 2018-01-09 | End: 2018-05-09 | Stop reason: SDUPTHER

## 2018-01-09 RX ORDER — MOMETASONE FUROATE AND FORMOTEROL FUMARATE DIHYDRATE 200; 5 UG/1; UG/1
AEROSOL RESPIRATORY (INHALATION)
Qty: 13 G | Refills: 5 | Status: SHIPPED | OUTPATIENT
Start: 2018-01-09 | End: 2018-10-04 | Stop reason: SDUPTHER

## 2018-01-09 RX ORDER — ATORVASTATIN CALCIUM 40 MG/1
TABLET, FILM COATED ORAL
Qty: 30 TABLET | Refills: 2 | Status: SHIPPED | OUTPATIENT
Start: 2018-01-09 | End: 2018-06-15 | Stop reason: SDUPTHER

## 2018-02-19 ENCOUNTER — LAB (OUTPATIENT)
Dept: LAB | Facility: HOSPITAL | Age: 68
End: 2018-02-19

## 2018-02-19 ENCOUNTER — APPOINTMENT (OUTPATIENT)
Dept: ONCOLOGY | Facility: HOSPITAL | Age: 68
End: 2018-02-19

## 2018-02-19 ENCOUNTER — OFFICE VISIT (OUTPATIENT)
Dept: ONCOLOGY | Facility: CLINIC | Age: 68
End: 2018-02-19

## 2018-02-19 VITALS
BODY MASS INDEX: 28.24 KG/M2 | HEART RATE: 60 BPM | OXYGEN SATURATION: 97 % | HEIGHT: 64 IN | WEIGHT: 165.4 LBS | TEMPERATURE: 98.7 F | DIASTOLIC BLOOD PRESSURE: 64 MMHG | SYSTOLIC BLOOD PRESSURE: 132 MMHG | RESPIRATION RATE: 16 BRPM

## 2018-02-19 DIAGNOSIS — C50.411 MALIGNANT NEOPLASM OF UPPER-OUTER QUADRANT OF RIGHT BREAST IN FEMALE, ESTROGEN RECEPTOR POSITIVE (HCC): Primary | ICD-10-CM

## 2018-02-19 DIAGNOSIS — M85.80 OSTEOPENIA, UNSPECIFIED LOCATION: ICD-10-CM

## 2018-02-19 DIAGNOSIS — C50.411 MALIGNANT NEOPLASM OF UPPER-OUTER QUADRANT OF RIGHT FEMALE BREAST (HCC): ICD-10-CM

## 2018-02-19 DIAGNOSIS — Z12.31 ENCOUNTER FOR SCREENING MAMMOGRAM FOR BREAST CANCER: ICD-10-CM

## 2018-02-19 DIAGNOSIS — M85.80 OSTEOPENIA: ICD-10-CM

## 2018-02-19 DIAGNOSIS — Z17.0 MALIGNANT NEOPLASM OF UPPER-OUTER QUADRANT OF RIGHT BREAST IN FEMALE, ESTROGEN RECEPTOR POSITIVE (HCC): Primary | ICD-10-CM

## 2018-02-19 LAB
ALBUMIN SERPL-MCNC: 4.4 G/DL (ref 3.5–5.2)
ALBUMIN/GLOB SERPL: 1.4 G/DL (ref 1.1–2.4)
ALP SERPL-CCNC: 68 U/L (ref 38–116)
ALT SERPL W P-5'-P-CCNC: 17 U/L (ref 0–33)
ANION GAP SERPL CALCULATED.3IONS-SCNC: 11.2 MMOL/L
AST SERPL-CCNC: 18 U/L (ref 0–32)
BASOPHILS # BLD AUTO: 0.03 10*3/MM3 (ref 0–0.1)
BASOPHILS NFR BLD AUTO: 0.4 % (ref 0–1.1)
BILIRUB SERPL-MCNC: 0.2 MG/DL (ref 0.1–1.2)
BUN BLD-MCNC: 17 MG/DL (ref 6–20)
BUN/CREAT SERPL: 20.5 (ref 7.3–30)
CALCIUM SPEC-SCNC: 9.7 MG/DL (ref 8.5–10.2)
CHLORIDE SERPL-SCNC: 101 MMOL/L (ref 98–107)
CO2 SERPL-SCNC: 30.8 MMOL/L (ref 22–29)
CREAT BLD-MCNC: 0.83 MG/DL (ref 0.6–1.1)
DEPRECATED RDW RBC AUTO: 44.8 FL (ref 37–49)
EOSINOPHIL # BLD AUTO: 0.19 10*3/MM3 (ref 0–0.36)
EOSINOPHIL NFR BLD AUTO: 2.6 % (ref 1–5)
ERYTHROCYTE [DISTWIDTH] IN BLOOD BY AUTOMATED COUNT: 13.7 % (ref 11.7–14.5)
GFR SERPL CREATININE-BSD FRML MDRD: 69 ML/MIN/1.73
GLOBULIN UR ELPH-MCNC: 3.1 GM/DL (ref 1.8–3.5)
GLUCOSE BLD-MCNC: 111 MG/DL (ref 74–124)
HCT VFR BLD AUTO: 46.8 % (ref 34–45)
HGB BLD-MCNC: 15 G/DL (ref 11.5–14.9)
IMM GRANULOCYTES # BLD: 0.02 10*3/MM3 (ref 0–0.03)
IMM GRANULOCYTES NFR BLD: 0.3 % (ref 0–0.5)
LYMPHOCYTES # BLD AUTO: 2.06 10*3/MM3 (ref 1–3.5)
LYMPHOCYTES NFR BLD AUTO: 28.5 % (ref 20–49)
MAGNESIUM SERPL-MCNC: 1.8 MG/DL (ref 1.8–2.5)
MCH RBC QN AUTO: 28.7 PG (ref 27–33)
MCHC RBC AUTO-ENTMCNC: 32.1 G/DL (ref 32–35)
MCV RBC AUTO: 89.5 FL (ref 83–97)
MONOCYTES # BLD AUTO: 0.45 10*3/MM3 (ref 0.25–0.8)
MONOCYTES NFR BLD AUTO: 6.2 % (ref 4–12)
NEUTROPHILS # BLD AUTO: 4.48 10*3/MM3 (ref 1.5–7)
NEUTROPHILS NFR BLD AUTO: 62 % (ref 39–75)
NRBC BLD MANUAL-RTO: 0 /100 WBC (ref 0–0)
PHOSPHATE SERPL-MCNC: 3.1 MG/DL (ref 2.5–4.5)
PLATELET # BLD AUTO: 266 10*3/MM3 (ref 150–375)
PMV BLD AUTO: 9.3 FL (ref 8.9–12.1)
POTASSIUM BLD-SCNC: 4 MMOL/L (ref 3.5–4.7)
PROT SERPL-MCNC: 7.5 G/DL (ref 6.3–8)
RBC # BLD AUTO: 5.23 10*6/MM3 (ref 3.9–5)
SODIUM BLD-SCNC: 143 MMOL/L (ref 134–145)
WBC NRBC COR # BLD: 7.23 10*3/MM3 (ref 4–10)

## 2018-02-19 PROCEDURE — 84100 ASSAY OF PHOSPHORUS: CPT | Performed by: INTERNAL MEDICINE

## 2018-02-19 PROCEDURE — 99214 OFFICE O/P EST MOD 30 MIN: CPT | Performed by: INTERNAL MEDICINE

## 2018-02-19 PROCEDURE — 80053 COMPREHEN METABOLIC PANEL: CPT | Performed by: INTERNAL MEDICINE

## 2018-02-19 PROCEDURE — 83735 ASSAY OF MAGNESIUM: CPT | Performed by: INTERNAL MEDICINE

## 2018-02-19 PROCEDURE — 36415 COLL VENOUS BLD VENIPUNCTURE: CPT | Performed by: INTERNAL MEDICINE

## 2018-02-19 PROCEDURE — 85025 COMPLETE CBC W/AUTO DIFF WBC: CPT | Performed by: INTERNAL MEDICINE

## 2018-02-19 RX ORDER — IBANDRONATE SODIUM 150 MG/1
150 TABLET, FILM COATED ORAL
Qty: 1 TABLET | Refills: 11 | Status: SHIPPED | OUTPATIENT
Start: 2018-02-19 | End: 2018-09-27

## 2018-02-19 NOTE — PROGRESS NOTES
Subjective:     Reason for follow up:   1. Stage IA, multifocal invasive ductal carcinoma of the right breast; both estrogen receptor positive and progesterone receptor positive, HER-2/roya negative.    * Status post lumpectomy on 11/13/2014 with negative sentinel lymph nodes, status post adjuvant radiation therapy, completed in March 2015.    * Adjuvant Arimidex initiated March 2015, to be continued for 5 years. .   2. Polycythemia, secondary.   3. Osteopenia, on Fosamax initiated April 2015     History of Present Ilness:  Senait Moore presents for follow-up of breast cancer.  She remains on Arimidex and is tolerating it well.  She has questions about her prognosis and risk of recurrence.  She's also interested in stopping the Prolia due to cost; would like to try Boniva.  She has not noticed any new abnormalities in her breast.  No new aches and pains of that she does struggle with arthralgias particularly in her knees.  She is physically active and is on a healthy diet and has recently lost about 11 pounds.    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[employer]     Social History Main Topics   • Smoking status: Former Smoker     Packs/day: 1.00     Years: 42.00     Types: Cigarettes     Quit date: 1/4/2015   • Smokeless tobacco: Never Used   • Alcohol use No      Comment: Significant H/O past alcohol use, but no current use   • Drug use: No   • Sexual activity: Not on file     Other  "Topics Concern   • Not on file     Social History Narrative     Family History   Family History   Problem Relation Age of Onset   • Heart disease Mother    • Hypertension Mother    • Diabetes Mother    • Colon cancer Mother 60   • Pancreatic cancer Mother    • Lung disease Father    • COPD Father    • Emphysema Father    • No Known Problems Sister    • No Known Problems Brother    • No Known Problems Daughter    • No Known Problems Son    • Heart attack Maternal Grandmother    • No Known Problems Paternal Grandmother    • No Known Problems Maternal Aunt    • No Known Problems Paternal Aunt    • Heart attack Maternal Grandfather    • BRCA 1/2 Neg Hx    • Breast cancer Neg Hx    • Endometrial cancer Neg Hx    • Ovarian cancer Neg Hx      Allergies No Known Allergies    Medications: The current medication list was reviewed in the EMR.    Review of Systems  Review of Systems   Constitutional: Negative for activity change, appetite change, chills, diaphoresis, fatigue, fever and unexpected weight change.   Respiratory: Negative for cough, chest tightness and shortness of breath.    Cardiovascular: Negative for chest pain, palpitations and leg swelling.   Gastrointestinal: Negative for abdominal pain, blood in stool, constipation, diarrhea, nausea and vomiting.   Musculoskeletal: Positive for arthralgias. Negative for joint swelling and myalgias.   Hematological: Negative for adenopathy. Does not bruise/bleed easily.          Objective:     Vitals:    02/19/18 1031   BP: 132/64   Pulse: 60   Resp: 16   Temp: 98.7 °F (37.1 °C)   TempSrc: Oral   SpO2: 97%   Weight: 75 kg (165 lb 6.4 oz)   Height: 162.1 cm (63.82\")  Comment: new ht without shoes   PainSc: 0-No pain     Current Status 2/19/2018   ECOG score 0     GENERAL: female comfortable, no acute distress  SKIN:  Warm, without rashes, purpura or petechiae.   EYES:  EOMs intact.  Conjunctivae normal. Pupils equal and reactive to light.   EARS:  Hearing intact.  LYMPHATICS: "  No cervical, supraclavicular, axillary adenopathy.  RESP:  Lungs clear to percussion and auscultation. Good airflow. Normal effort.   CHEST: Mediport -No; Breast exam - Yes, describe: Bilateral breast exam performed.  Right breast is smaller than left breast.  She does not have palpable masses or skin changes or nipple discharge in either breast.  CARDIAC:  Regular rate and rhythm without murmurs, rubs or gallops. Normal S1,S2. Lower extremity edema:  No  GI:  Soft, nontender, normal bowel sounds, no hepatosplenomegaly  PSYCHIATRIC:  Normal affect and mood; alert and oriented x 3; Insight and judgement appropriate      Labs and Imaging  Lab Results   Component Value Date    WBC 7.23 02/19/2018    HGB 15.0 (H) 02/19/2018    HCT 46.8 (H) 02/19/2018    MCV 89.5 02/19/2018     02/19/2018       Breast imaging: None     DEXA 6/2017:  FINDINGS: The bone mineral density in the lumbar spine has a T value of  -1.7 representing moderate osteopenia. The bone mineral density the left  femoral neck has a T value -1.2 representing mild osteopenia.    Assessment/Plan     Assessment:   1. Stage IA ([id number]) multifocal invasive ductal carcinoma, strongly estrogen-receptor positive, progesterone-receptor positive and HER-2/roya negative measuring 1.3 x 1.1 cm with negative sentinel lymph nodes.    * Patient is status post lumpectomy and adjuvant radiation therapy.   * Initiated on Arimidex in March 2015. Generally tolerates Arimidex well.    * Remains no evidence of disease.    * I reviewed prognosis and risk of recurrence over time with the patient today.  We discussed about possibly doing 7 years of endocrine therapy as long she is tolerating it well in her bones will tolerate it.   2. Osteopenia based on DEXA scan. Improved on Prolia. Will change to Boniva at patient's requiest.   3. Secondary erythrocytosis, JAK2 negative.   4. Hot flashes due to Arimidex. Tolerable.      Plan:     1. Continue Arimidex.   2. Annual  mammogram due August - ordered today  3. DEXA scan due June 2019.  4. Discontinue Prolia; start Boniva.   5.  Patient was instructed on the importance of physical activity, healthy diet, and self breast exams.  Patient will continue calcium and vitamin D supplementation.     Follow-up in 6 months. I asked the patient to call for any questions, concerns, or new symptoms.  I spent 20 minutes with the patient today with 15 minutes spent face-to-face counseling regarding her risk of recurrence particularly for an estrogen positive breast cancer while it is low, the risk of recurrence does increase over time.

## 2018-05-09 RX ORDER — ANASTROZOLE 1 MG/1
TABLET ORAL
Qty: 30 TABLET | Refills: 0 | Status: SHIPPED | OUTPATIENT
Start: 2018-05-09 | End: 2018-06-14 | Stop reason: SDUPTHER

## 2018-05-10 ENCOUNTER — OFFICE VISIT (OUTPATIENT)
Dept: FAMILY MEDICINE CLINIC | Facility: CLINIC | Age: 68
End: 2018-05-10

## 2018-05-10 VITALS
DIASTOLIC BLOOD PRESSURE: 68 MMHG | SYSTOLIC BLOOD PRESSURE: 126 MMHG | TEMPERATURE: 98.9 F | OXYGEN SATURATION: 95 % | RESPIRATION RATE: 16 BRPM | WEIGHT: 168 LBS | HEART RATE: 97 BPM | BODY MASS INDEX: 28.68 KG/M2 | HEIGHT: 64 IN

## 2018-05-10 DIAGNOSIS — R09.82 POST-NASAL DRIP: ICD-10-CM

## 2018-05-10 DIAGNOSIS — J01.80 OTHER ACUTE SINUSITIS, RECURRENCE NOT SPECIFIED: Primary | ICD-10-CM

## 2018-05-10 PROCEDURE — 99213 OFFICE O/P EST LOW 20 MIN: CPT | Performed by: NURSE PRACTITIONER

## 2018-05-10 NOTE — PROGRESS NOTES
"Subjective   Senait Moore is a 67 y.o. female who presents for evaluation of sore throat. Associated symptoms include chills, pain while swallowing, sore throat and temp 99.5. Onset of symptoms was 1 day ago, and have been gradually improving since that time. She is drinking plenty of fluids. She has not had a recent close exposure to someone with proven streptococcal pharyngitis.  She was seen at the local clinic yesterday and had a flu test and strep tests, which were both negative.  She was thought to possibly have thrush and was prescribed a statin, which she did not have filled.  Her symptoms have since improved.  Yesterday she also had periods of dizziness which was new for her.  She does suffer from allergies and takes Zyrtec daily.    The following portions of the patient's history were reviewed and updated as appropriate: past family history, past social history, past surgical history and problem list.    Review of Systems  A comprehensive review of systems was negative except for: Ears, nose, mouth, throat, and face: positive for sore throat     Objective   /68 (BP Location: Left arm, Patient Position: Sitting, Cuff Size: Adult)   Pulse 97   Temp 98.9 °F (37.2 °C)   Resp 16   Ht 162.1 cm (63.82\")   Wt 76.2 kg (168 lb)   LMP  (LMP Unknown)   SpO2 95%   BMI 29.00 kg/m²   General appearance: alert, appears stated age and cooperative  Head: Normocephalic, without obvious abnormality, atraumatic  Sinus congestion  Ears: normal TM's and external ear canals both ears  Nose: Nares normal. Septum midline. Mucosa normal. No drainage or sinus tenderness.  Throat: lips, mucosa, and tongue normal; teeth and gums normal  Neck: no adenopathy, no carotid bruit, no JVD, supple, symmetrical, trachea midline and thyroid not enlarged, No thrush. symmetric, no tenderness/mass/nodules.  Positive PND.    Lungs: clear to auscultation bilaterally  Heart: regular rate and rhythm, S1, S2 normal, no murmur, click, " rub or gallop  Skin: Skin color, texture, turgor normal. No rashes or lesions    Laboratory  Strep test not done. Results:Strept test completed at [facility] which was negative...    Assessment/Plan   Acute pharyngitis, likely   Sinusitis with post nasal drip.     Use of decongestant recommended.  Follow up as needed.    In summary, appears her symptoms have since resolved.  She no longer has a sore throat.  Exam normal.  No evidence of thrush.  Instructed no need to take Nystatin.  Appears her symptoms are related to flare up of allergies and sore throat related to PND.   Instructed to continue Zyrtec and add Flonase.  May also need Sudafed for short period of time during acute flare up.  She leaves for vacation on Saturday.  She knows to contact us if her symptoms return or worsen and I will send in an antibiotic.    Maria Elena Arechiga, APRN

## 2018-05-11 ENCOUNTER — TELEPHONE (OUTPATIENT)
Dept: FAMILY MEDICINE CLINIC | Facility: CLINIC | Age: 68
End: 2018-05-11

## 2018-05-11 DIAGNOSIS — J02.9 ACUTE PHARYNGITIS, UNSPECIFIED ETIOLOGY: Primary | ICD-10-CM

## 2018-05-11 RX ORDER — AZITHROMYCIN 250 MG/1
TABLET, FILM COATED ORAL
Qty: 6 TABLET | Refills: 0 | Status: SHIPPED | OUTPATIENT
Start: 2018-05-11 | End: 2018-05-11

## 2018-05-22 ENCOUNTER — OFFICE VISIT (OUTPATIENT)
Dept: FAMILY MEDICINE CLINIC | Facility: CLINIC | Age: 68
End: 2018-05-22

## 2018-05-22 VITALS
HEIGHT: 64 IN | WEIGHT: 172 LBS | OXYGEN SATURATION: 94 % | HEART RATE: 80 BPM | SYSTOLIC BLOOD PRESSURE: 130 MMHG | DIASTOLIC BLOOD PRESSURE: 80 MMHG | TEMPERATURE: 98.2 F | BODY MASS INDEX: 29.37 KG/M2 | RESPIRATION RATE: 18 BRPM

## 2018-05-22 DIAGNOSIS — J20.9 COPD WITH ACUTE BRONCHITIS (HCC): Primary | ICD-10-CM

## 2018-05-22 DIAGNOSIS — J44.0 COPD WITH ACUTE BRONCHITIS (HCC): Primary | ICD-10-CM

## 2018-05-22 PROCEDURE — 99213 OFFICE O/P EST LOW 20 MIN: CPT | Performed by: NURSE PRACTITIONER

## 2018-05-22 NOTE — PROGRESS NOTES
Subjective   Senait Moore is a 67 y.o. female.     Bronchitis        Senait Moore is a 67 year old female here for follow-up after recent hospitalization. She was seen in the office on 5/10/18 for evaluation of sore throat.  She was seen at outpatient clinic and had strept and flu test which were both negative the day prior to office visit.  Was thought her sore throat was related to thrush when seen at clinic.  She followed up here the next day when her symptoms had improved.  She had no evidence of thrush.  She was afebrile.  Appeared symptoms related to sinusitis.  After office visit, she states her condition rapidly worsened.  She developed worsening shortness of breath and fatigue.  She went to an immediate care center and was found to have fever of 103 and wheezing.  She was given a breathing treatment and instructed to report to the ER.  She was admitted for possible pneumonia.  WBC was elevated.  Her lactic acid level was elevated.  She was requiring oxygen to keep stats greater than 90%.  She was admitted to the hospital for further treatment.  She was started on antibiotics.  She had CXR which was stable.  CT chest was completed which showed no effusion or mass, however there was peribronchial infiltrates.  She was diagnosed with acute bronchitis.  She was eventually weaned off oxygen.  Her lactic acid level decreased.  Her condition improved and she was discharged in stable condition on 5/13/18.   She actually left for vacation on date of discharge to [facility].  Recently return on Friday.  She went to work at [employer] on Sunday.  Continues to feel short of breath, however gradually improving.  She denies any fever, chills, cough, hemoptysis, chest pain, or any other concerns.                The following portions of the patient's history were reviewed and updated as appropriate: past family history, past social history, past surgical history and problem list.    Review of Systems   Respiratory:  Positive for shortness of breath.    All other systems reviewed and are negative.    Vitals:    05/22/18 1113   BP: 130/80   Pulse: 80   Resp: 18   Temp: 98.2 °F (36.8 °C)   SpO2: 94%       Objective   Physical Exam   Constitutional: She is oriented to person, place, and time. She appears well-developed and well-nourished.   HENT:   Head: Normocephalic and atraumatic.   Eyes: Pupils are equal, round, and reactive to light.   Neck: Normal range of motion. Neck supple.   Cardiovascular: Normal rate and regular rhythm.    Pulmonary/Chest: Effort normal and breath sounds normal. No respiratory distress. She has no wheezes.   Neurological: She is alert and oriented to person, place, and time.   Skin: Skin is warm and dry.   Psychiatric: She has a normal mood and affect.     Current outpatient and discharge medications have been reconciled for the patient.  Reviewed by: SURYA Velasquez      Assessment/Plan   Senait was seen today for bronchitis.    Diagnoses and all orders for this visit:    COPD with acute bronchitis    Summary, appears Senait Moore has recovered from recent illness.  Exam is normal today.  She is maintaining O2 sats of 94 % on room air.  She is gradually improving since hospitalization.  Remains afebrile. Will continue current medications.  No change in treatment plan at this time.  She knows to contact us if her symptoms worsen. If continues to have problems with COPD, will refer to pulmonary.        SURYA Gardner

## 2018-06-14 RX ORDER — ANASTROZOLE 1 MG/1
TABLET ORAL
Qty: 30 TABLET | Refills: 5 | Status: SHIPPED | OUTPATIENT
Start: 2018-06-14 | End: 2018-09-27

## 2018-06-15 DIAGNOSIS — E78.00 HYPERCHOLESTEROLEMIA: ICD-10-CM

## 2018-06-15 DIAGNOSIS — I10 BENIGN ESSENTIAL HYPERTENSION: ICD-10-CM

## 2018-06-15 RX ORDER — ATORVASTATIN CALCIUM 40 MG/1
40 TABLET, FILM COATED ORAL DAILY
Qty: 30 TABLET | Refills: 5 | Status: SHIPPED | OUTPATIENT
Start: 2018-06-15 | End: 2018-08-09 | Stop reason: SDUPTHER

## 2018-06-15 RX ORDER — HYDROCHLOROTHIAZIDE 25 MG/1
25 TABLET ORAL DAILY
Qty: 30 TABLET | Refills: 5 | Status: SHIPPED | OUTPATIENT
Start: 2018-06-15 | End: 2019-03-26 | Stop reason: SDUPTHER

## 2018-08-09 DIAGNOSIS — E78.00 HYPERCHOLESTEROLEMIA: ICD-10-CM

## 2018-08-09 RX ORDER — ATORVASTATIN CALCIUM 40 MG/1
40 TABLET, FILM COATED ORAL DAILY
Qty: 30 TABLET | Refills: 5 | Status: SHIPPED | OUTPATIENT
Start: 2018-08-09 | End: 2019-09-03 | Stop reason: SDUPTHER

## 2018-08-29 ENCOUNTER — HOSPITAL ENCOUNTER (OUTPATIENT)
Dept: MAMMOGRAPHY | Facility: HOSPITAL | Age: 68
Discharge: HOME | End: 2018-08-29
Attending: INTERNAL MEDICINE | Admitting: INTERNAL MEDICINE

## 2018-08-29 DIAGNOSIS — Z17.0 MALIGNANT NEOPLASM OF UPPER-OUTER QUADRANT OF RIGHT BREAST IN FEMALE, ESTROGEN RECEPTOR POSITIVE (HCC): ICD-10-CM

## 2018-08-29 DIAGNOSIS — Z12.31 ENCOUNTER FOR SCREENING MAMMOGRAM FOR BREAST CANCER: ICD-10-CM

## 2018-08-29 DIAGNOSIS — C50.411 MALIGNANT NEOPLASM OF UPPER-OUTER QUADRANT OF RIGHT BREAST IN FEMALE, ESTROGEN RECEPTOR POSITIVE (HCC): ICD-10-CM

## 2018-08-29 PROCEDURE — 77067 SCR MAMMO BI INCL CAD: CPT

## 2018-09-06 ENCOUNTER — LAB (OUTPATIENT)
Dept: LAB | Facility: HOSPITAL | Age: 68
End: 2018-09-06

## 2018-09-06 ENCOUNTER — OFFICE VISIT (OUTPATIENT)
Dept: ONCOLOGY | Facility: CLINIC | Age: 68
End: 2018-09-06

## 2018-09-06 VITALS
BODY MASS INDEX: 28.56 KG/M2 | HEART RATE: 64 BPM | SYSTOLIC BLOOD PRESSURE: 128 MMHG | HEIGHT: 64 IN | RESPIRATION RATE: 16 BRPM | OXYGEN SATURATION: 95 % | DIASTOLIC BLOOD PRESSURE: 64 MMHG | TEMPERATURE: 98.4 F | WEIGHT: 167.3 LBS

## 2018-09-06 DIAGNOSIS — C50.411 MALIGNANT NEOPLASM OF UPPER-OUTER QUADRANT OF RIGHT BREAST IN FEMALE, ESTROGEN RECEPTOR POSITIVE (HCC): ICD-10-CM

## 2018-09-06 DIAGNOSIS — Z17.0 MALIGNANT NEOPLASM OF UPPER-OUTER QUADRANT OF RIGHT BREAST IN FEMALE, ESTROGEN RECEPTOR POSITIVE (HCC): ICD-10-CM

## 2018-09-06 DIAGNOSIS — Z17.0 MALIGNANT NEOPLASM OF UPPER-OUTER QUADRANT OF RIGHT BREAST IN FEMALE, ESTROGEN RECEPTOR POSITIVE (HCC): Primary | ICD-10-CM

## 2018-09-06 DIAGNOSIS — C50.411 MALIGNANT NEOPLASM OF UPPER-OUTER QUADRANT OF RIGHT BREAST IN FEMALE, ESTROGEN RECEPTOR POSITIVE (HCC): Primary | ICD-10-CM

## 2018-09-06 LAB
ALBUMIN SERPL-MCNC: 4.5 G/DL (ref 3.5–5.2)
ALBUMIN/GLOB SERPL: 1.5 G/DL (ref 1.1–2.4)
ALP SERPL-CCNC: 71 U/L (ref 38–116)
ALT SERPL W P-5'-P-CCNC: 19 U/L (ref 0–33)
ANION GAP SERPL CALCULATED.3IONS-SCNC: 10.4 MMOL/L
AST SERPL-CCNC: 17 U/L (ref 0–32)
BASOPHILS # BLD AUTO: 0.03 10*3/MM3 (ref 0–0.1)
BASOPHILS NFR BLD AUTO: 0.4 % (ref 0–1.1)
BILIRUB SERPL-MCNC: 0.3 MG/DL (ref 0.1–1.2)
BUN BLD-MCNC: 15 MG/DL (ref 6–20)
BUN/CREAT SERPL: 17.9 (ref 7.3–30)
CALCIUM SPEC-SCNC: 9.7 MG/DL (ref 8.5–10.2)
CHLORIDE SERPL-SCNC: 102 MMOL/L (ref 98–107)
CO2 SERPL-SCNC: 29.6 MMOL/L (ref 22–29)
CREAT BLD-MCNC: 0.84 MG/DL (ref 0.6–1.1)
DEPRECATED RDW RBC AUTO: 46 FL (ref 37–49)
EOSINOPHIL # BLD AUTO: 0.2 10*3/MM3 (ref 0–0.36)
EOSINOPHIL NFR BLD AUTO: 2.6 % (ref 1–5)
ERYTHROCYTE [DISTWIDTH] IN BLOOD BY AUTOMATED COUNT: 14.1 % (ref 11.7–14.5)
GFR SERPL CREATININE-BSD FRML MDRD: 68 ML/MIN/1.73
GLOBULIN UR ELPH-MCNC: 3 GM/DL (ref 1.8–3.5)
GLUCOSE BLD-MCNC: 109 MG/DL (ref 74–124)
HCT VFR BLD AUTO: 47.1 % (ref 34–45)
HGB BLD-MCNC: 15 G/DL (ref 11.5–14.9)
IMM GRANULOCYTES # BLD: 0.03 10*3/MM3 (ref 0–0.03)
IMM GRANULOCYTES NFR BLD: 0.4 % (ref 0–0.5)
LYMPHOCYTES # BLD AUTO: 2.07 10*3/MM3 (ref 1–3.5)
LYMPHOCYTES NFR BLD AUTO: 27.4 % (ref 20–49)
MCH RBC QN AUTO: 28.5 PG (ref 27–33)
MCHC RBC AUTO-ENTMCNC: 31.8 G/DL (ref 32–35)
MCV RBC AUTO: 89.5 FL (ref 83–97)
MONOCYTES # BLD AUTO: 0.48 10*3/MM3 (ref 0.25–0.8)
MONOCYTES NFR BLD AUTO: 6.4 % (ref 4–12)
NEUTROPHILS # BLD AUTO: 4.74 10*3/MM3 (ref 1.5–7)
NEUTROPHILS NFR BLD AUTO: 62.8 % (ref 39–75)
NRBC BLD MANUAL-RTO: 0 /100 WBC (ref 0–0)
PLATELET # BLD AUTO: 276 10*3/MM3 (ref 150–375)
PMV BLD AUTO: 9 FL (ref 8.9–12.1)
POTASSIUM BLD-SCNC: 4.6 MMOL/L (ref 3.5–4.7)
PROT SERPL-MCNC: 7.5 G/DL (ref 6.3–8)
RBC # BLD AUTO: 5.26 10*6/MM3 (ref 3.9–5)
SODIUM BLD-SCNC: 142 MMOL/L (ref 134–145)
WBC NRBC COR # BLD: 7.55 10*3/MM3 (ref 4–10)

## 2018-09-06 PROCEDURE — 80053 COMPREHEN METABOLIC PANEL: CPT | Performed by: INTERNAL MEDICINE

## 2018-09-06 PROCEDURE — 85025 COMPLETE CBC W/AUTO DIFF WBC: CPT | Performed by: INTERNAL MEDICINE

## 2018-09-06 PROCEDURE — 36415 COLL VENOUS BLD VENIPUNCTURE: CPT | Performed by: INTERNAL MEDICINE

## 2018-09-06 PROCEDURE — 99214 OFFICE O/P EST MOD 30 MIN: CPT | Performed by: INTERNAL MEDICINE

## 2018-09-06 NOTE — PROGRESS NOTES
Subjective:     Reason for follow up:   1. Stage IA, multifocal invasive ductal carcinoma of the right breast; both estrogen receptor positive and progesterone receptor positive, HER-2/roya negative.    * Status post lumpectomy on 11/13/2014 with negative sentinel lymph nodes, status post adjuvant radiation therapy, completed in March 2015.    * Adjuvant Arimidex 3/2015 - 7/2018. Stopped to due arthralgias and hot flashes. Declined other endocrine therapy.   2. Polycythemia, secondary.   3. Osteopenia    History of Present Ilness:  Senait STREET Rajim presents for follow-up of breast cancer.  She stopped taking Boniva due to swallowing difficulties. She was admitted to [facility] with COPD exacerbation. She also stopped Arimidex due to side effects at the end of July - her arthralgias have improved. She's very comfortable with her decision. She's not interested in trying another type of endocrine therapy.   Her weight is essentially stable since I last saw her in February. She had gained weight but then has lost again since stopping Arimidex  Hot flashes dramatically improved off Arimidex.     Reviewed, confirmed and updated history (past medical, social and family)   Past Medical History:   Diagnosis Date   • Asthma    • Breast cancer (CMS/HCC) 2014    Right, stage IA, multifocal invasive ductal carcinoma   • Colon polyps    • COPD (chronic obstructive pulmonary disease) (CMS/HCC)    • Emphysema of lung (CMS/HCC)    • History of tobacco use    • Hx of radiation therapy 01/2015   • Hypercholesterolemia    • Hypertension    • Osteopenia    • Radiation    • Secondary polycythemia      Social History  Social History     Social History   • Marital status:      Spouse name: Cayden   • Number of children: 2   • Years of education: College-2 years     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[employer]     Social History Main Topics   • Smoking status: Former Smoker     Packs/day: 1.00     Years: 42.00     Types:  "Cigarettes     Quit date: 1/4/2015   • Smokeless tobacco: Never Used   • Alcohol use No      Comment: Significant H/O past alcohol use, but no current use   • Drug use: No   • Sexual activity: Not on file     Other Topics Concern   • Not on file     Social History Narrative   • No narrative on file     Family History   Family History   Problem Relation Age of Onset   • Heart disease Mother    • Hypertension Mother    • Diabetes Mother    • Colon cancer Mother 60   • Pancreatic cancer Mother    • Lung disease Father    • COPD Father    • Emphysema Father    • No Known Problems Sister    • No Known Problems Brother    • No Known Problems Daughter    • No Known Problems Son    • Heart attack Maternal Grandmother    • No Known Problems Paternal Grandmother    • No Known Problems Maternal Aunt    • No Known Problems Paternal Aunt    • Heart attack Maternal Grandfather    • BRCA 1/2 Neg Hx    • Breast cancer Neg Hx    • Endometrial cancer Neg Hx    • Ovarian cancer Neg Hx      Allergies No Known Allergies    Medications: The current medication list was reviewed in the EMR.    Review of Systems  Review of Systems   Constitutional: Negative for activity change, appetite change, chills, diaphoresis, fatigue, fever and unexpected weight change.   HENT: Negative.    Eyes: Negative.    Respiratory: Negative for cough, chest tightness and shortness of breath.    Cardiovascular: Negative for chest pain, palpitations and leg swelling.   Gastrointestinal: Negative for abdominal pain, blood in stool, constipation, diarrhea, nausea and vomiting.   Musculoskeletal: Positive for arthralgias. Negative for joint swelling and myalgias.   Hematological: Negative for adenopathy. Does not bruise/bleed easily.          Objective:     Vitals:    09/06/18 1019   BP: 128/64   Pulse: 64   Resp: 16   Temp: 98.4 °F (36.9 °C)   TempSrc: Oral   SpO2: 95%   Weight: 75.9 kg (167 lb 4.8 oz)   Height: 162 cm (63.78\")   PainSc: 0-No pain     Current " Status 9/6/2018   ECOG score 0   GENERAL: female comfortable, no acute distress  SKIN:  Warm, without rashes, purpura or petechiae.   EYES:  EOMs intact.  Conjunctivae normal. Pupils equal and reactive to light.   EARS:  Hearing intact.  LYMPHATICS:  No cervical, supraclavicular, axillary adenopathy.  RESP:  Lungs clear to percussion and auscultation. Good airflow. Normal effort.   CHEST: Breast exam - Yes, describe: Left breast is bigger than right breast.  She has no palpable masses or skin changes.  No nipple discharge.  CARDIAC:  Regular rate and rhythm without murmurs, rubs or gallops. Normal S1,S2. Lower extremity edema:  No  GI:  Soft, nontender, normal bowel sounds, no hepatosplenomegaly  PSYCHIATRIC:  Normal affect and mood; alert and oriented x 3; Insight and judgement appropriate      Labs and Imaging  Lab Results   Component Value Date    WBC 7.55 09/06/2018    HGB 15.0 (H) 09/06/2018    HCT 47.1 (H) 09/06/2018    MCV 89.5 09/06/2018     09/06/2018       Breast imaging: Mammo Screening Bilateral With Cad    Result Date: 8/30/2018  Narrative: DIGITAL BILATERAL MAMMOGRAMS WITH R2 COMPUTER-AIDED DETECTION 08/29/2018   HISTORY: 68-year-old screening.  TECHNIQUE: Digital bilateral mammograms were obtained in the CC and MLO projections and compared to the previous studies dated 08/22/2016 and 08/28/2017.  FINDINGS:  Scattered fibroglandular densities are seen in both breasts. Surgical clips are seen in the upper outer quadrant of the right breast and in the right axilla. Scar markers are seen on the right. Benign-appearing lymph nodes are seen in the left axilla.  Small area of increased density is seen in the upper aspect of the left breast on MLO view also seen on the previous study.  No dominant masses, microcalcifications, skin changes or architectural distortion is seen.      Impression: No radiographic evidence of malignancy.  BI-RADS CATEGORY 2: Benign findings.  This report was finalized on  8/30/2018 9:02 AM by Dr. Scott Parra M.D.          Assessment/Plan     Assessment:   1. Stage IA ([id number]) multifocal invasive ductal carcinoma, strongly estrogen-receptor positive, progesterone-receptor positive and HER-2/roya negative measuring 1.3 x 1.1 cm with negative sentinel lymph nodes.    * Patient is status post lumpectomy 11/2014 and adjuvant radiation therapy.   * Initiated on Arimidex in March 2015.  Stopped 7/2018 due to arthralgias and hot flashes. Declined additional endocrine therapy.    * Remains no evidence of disease.   2. Osteopenia based on DEXA scan. Improved on Prolia but patient had to stop due to costs. Transitioned to Boniva 2/2018 but unable to tolerate. Doesn't want to try anything else.   3. Secondary erythrocytosis, JAK2 negative.   4. Hot flashes due to Arimidex. Resolved off AI.      Plan:     1. Patient has stopped Arimidex.   2. Annual mammogram due August 2019  3. DEXA scan due August 2019.  4. Patient desires to space follow ups out since she's off AI. She's 4 years out from her diagnosis. I've recommended 9 month follow up.   5. Patient was instructed on the importance of physical activity, healthy diet, and self breast exams.  Patient will continue calcium and vitamin D supplementation.     Follow-up in 9 months. I asked the patient to call for any questions, concerns, or new symptoms.

## 2018-09-20 DIAGNOSIS — E78.00 HYPERCHOLESTEREMIA: Primary | ICD-10-CM

## 2018-09-21 LAB
CHOLEST SERPL-MCNC: 155 MG/DL (ref 0–200)
HDLC SERPL-MCNC: 44 MG/DL (ref 40–60)
LDLC SERPL CALC-MCNC: 94 MG/DL (ref 0–100)
TRIGL SERPL-MCNC: 83 MG/DL (ref 0–150)
TSH SERPL DL<=0.005 MIU/L-ACNC: 0.77 MIU/ML (ref 0.27–4.2)
VLDLC SERPL CALC-MCNC: 16.6 MG/DL (ref 7–27)

## 2018-09-27 ENCOUNTER — OFFICE VISIT (OUTPATIENT)
Dept: FAMILY MEDICINE CLINIC | Facility: CLINIC | Age: 68
End: 2018-09-27

## 2018-09-27 VITALS
SYSTOLIC BLOOD PRESSURE: 110 MMHG | OXYGEN SATURATION: 95 % | HEIGHT: 64 IN | DIASTOLIC BLOOD PRESSURE: 60 MMHG | TEMPERATURE: 98.2 F | HEART RATE: 70 BPM | RESPIRATION RATE: 16 BRPM | WEIGHT: 165 LBS | BODY MASS INDEX: 28.17 KG/M2

## 2018-09-27 DIAGNOSIS — J43.9 PULMONARY EMPHYSEMA, UNSPECIFIED EMPHYSEMA TYPE (HCC): ICD-10-CM

## 2018-09-27 DIAGNOSIS — Z00.00 WELL ADULT EXAM: Primary | ICD-10-CM

## 2018-09-27 DIAGNOSIS — I10 BENIGN ESSENTIAL HYPERTENSION: ICD-10-CM

## 2018-09-27 DIAGNOSIS — E78.00 HYPERCHOLESTEROLEMIA: ICD-10-CM

## 2018-09-27 PROCEDURE — 99397 PER PM REEVAL EST PAT 65+ YR: CPT | Performed by: NURSE PRACTITIONER

## 2018-09-27 NOTE — PROGRESS NOTES
Patient ID: Senait Moore is a 68 y.o. female     Subjective     Chief Complaint   Patient presents with   • Annual Exam     History of Present Illness    Senait Moore presents to the office today for annual exam.  Since she was last seen, she has been doing well. She denies any new problems or concerns.  She is s/p lumpectomy on 11/13/2014 with negative sentinel lymph nodes s/p radiation therapy which was completed in March 2015.  She was also started on Arimidex 2015 however was stopped in July of this year due to side effects. Declined any further endocrine therapy.  She is 4 years out without evidence of disease reoccurrence.  Recommended continued follow-up with breast exams with next due in 9 months.  She was seeing Dr. Iglesias who is moving out of state.  Patient would rather come here for follow up instead of oncology due to previous physician moving.  She has also been found to have osteopenia.  She was on Prolia which helped however had to stop due to cost.  She tried Boniva but did not tolerate.  Currently she is taking calcium with vitamin D.  Due for next Dexa scan in August 2019.  No other problems or concerns.  She has COPD which has been under control with no recent exacerbations.      She denies any complaints of fever, chills, cough, chest pain, shortness of air, abdominal pain, nausea, or any other concerns.     The following portions of the patient's history were reviewed and updated as appropriate: allergies, current medications, past family history, past medical history, past social history, past surgical history and problem list.     Review of Systems   Constitution: Negative.   HENT: Negative.    Eyes: Negative.    Cardiovascular: Negative.    Respiratory: Negative.    Endocrine: Negative.    Hematologic/Lymphatic: Negative.    Skin: Negative.    Musculoskeletal: Negative.    Gastrointestinal: Negative.    Genitourinary: Negative.    Neurological: Negative.    Psychiatric/Behavioral:  Negative.        Vitals:    09/27/18 0758   BP: 110/60   Pulse: 70   Resp: 16   Temp: 98.2 °F (36.8 °C)   SpO2: 95%       Documented weights    09/27/18 0758   Weight: 74.8 kg (165 lb)       Results for orders placed or performed in visit on 09/20/18   Lipid Panel   Result Value Ref Range    Total Cholesterol 155 0 - 200 mg/dL    Triglycerides 83 0 - 150 mg/dL    HDL Cholesterol 44 40 - 60 mg/dL    VLDL Cholesterol 16.6 7 - 27 mg/dL    LDL Cholesterol  94 0 - 100 mg/dL   TSH   Result Value Ref Range    TSH 0.765 0.270 - 4.200 mIU/mL       Objective     Physical Exam   Constitutional: She is oriented to person, place, and time. Vital signs are normal. She appears well-developed.   HENT:   Head: Normocephalic and atraumatic.   Right Ear: Tympanic membrane normal.   Left Ear: Tympanic membrane normal.   Mouth/Throat: Oropharynx is clear and moist.   Eyes: Pupils are equal, round, and reactive to light. EOM are normal.   Neck: Normal range of motion. Neck supple.   Cardiovascular: Normal rate, regular rhythm, normal heart sounds and intact distal pulses.    No murmur heard.  Pulmonary/Chest: Effort normal and breath sounds normal. She has no wheezes. She has no rhonchi. She has no rales.   Abdominal: Soft. Bowel sounds are normal. There is no hepatosplenomegaly. There is no tenderness.   Musculoskeletal: Normal range of motion. She exhibits no edema or tenderness.   Neurological: She is alert and oriented to person, place, and time. She has normal strength.   Skin: Skin is warm and dry. No rash noted. No cyanosis or erythema.   Psychiatric: She has a normal mood and affect. Her behavior is normal.          Assessment/Plan     Assessment/Plan   Senait was seen today for annual exam.    Diagnoses and all orders for this visit:    Well adult exam    Hypercholesterolemia    Benign essential hypertension    Pulmonary emphysema, unspecified emphysema type (CMS/HCC)        Summary:  Senait Moore has been doing well since  she was last seen.  Reviewed recent labs and recent biometric screening which was completed at [facility].  She is WNL of all parameters.  Her B/P is controlled.  She is watching her carbohydrate intake.  She is to return in 8-9 months for recheck and breast exam since she is starting to come here for continued surveillance of her history of breast cancer since Dr. Iglesias is leaving.      In the meantime, instructed to contact us sooner for any problems or concerns.    Maria Elena Arechiga, APRN  Family Medicine  [facility]  09/27/18  11:48 AM

## 2018-10-04 DIAGNOSIS — J43.8 OTHER EMPHYSEMA (HCC): ICD-10-CM

## 2018-10-08 DIAGNOSIS — R06.09 DYSPNEA ON EXERTION: ICD-10-CM

## 2018-10-08 RX ORDER — ALBUTEROL SULFATE 90 UG/1
2 AEROSOL, METERED RESPIRATORY (INHALATION) EVERY 4 HOURS PRN
Qty: 1 INHALER | Refills: 1 | Status: SHIPPED | OUTPATIENT
Start: 2018-10-08 | End: 2020-02-13

## 2019-03-26 DIAGNOSIS — I10 BENIGN ESSENTIAL HYPERTENSION: ICD-10-CM

## 2019-03-26 RX ORDER — HYDROCHLOROTHIAZIDE 25 MG/1
TABLET ORAL
Qty: 30 TABLET | Refills: 2 | Status: SHIPPED | OUTPATIENT
Start: 2019-03-26 | End: 2019-07-18 | Stop reason: SDUPTHER

## 2019-05-30 DIAGNOSIS — J43.8 OTHER EMPHYSEMA (HCC): ICD-10-CM

## 2019-05-31 RX ORDER — MOMETASONE FUROATE AND FORMOTEROL FUMARATE DIHYDRATE 200; 5 UG/1; UG/1
AEROSOL RESPIRATORY (INHALATION)
Qty: 1 INHALER | Refills: 4 | Status: SHIPPED | OUTPATIENT
Start: 2019-05-31 | End: 2019-11-01 | Stop reason: SDUPTHER

## 2019-07-18 DIAGNOSIS — I10 BENIGN ESSENTIAL HYPERTENSION: ICD-10-CM

## 2019-07-18 RX ORDER — HYDROCHLOROTHIAZIDE 25 MG/1
25 TABLET ORAL DAILY
Qty: 30 TABLET | Refills: 1 | Status: SHIPPED | OUTPATIENT
Start: 2019-07-18 | End: 2019-10-04 | Stop reason: SDUPTHER

## 2019-08-01 ENCOUNTER — TRANSCRIBE ORDERS (OUTPATIENT)
Dept: ADMINISTRATIVE | Facility: HOSPITAL | Age: 69
End: 2019-08-01

## 2019-08-01 DIAGNOSIS — Z12.31 VISIT FOR SCREENING MAMMOGRAM: Primary | ICD-10-CM

## 2019-09-03 ENCOUNTER — HOSPITAL ENCOUNTER (OUTPATIENT)
Dept: MAMMOGRAPHY | Facility: HOSPITAL | Age: 69
Discharge: HOME | End: 2019-09-03
Admitting: NURSE PRACTITIONER

## 2019-09-03 DIAGNOSIS — Z12.31 VISIT FOR SCREENING MAMMOGRAM: ICD-10-CM

## 2019-09-03 DIAGNOSIS — E78.00 HYPERCHOLESTEROLEMIA: ICD-10-CM

## 2019-09-03 PROCEDURE — 77067 SCR MAMMO BI INCL CAD: CPT

## 2019-09-03 RX ORDER — ATORVASTATIN CALCIUM 40 MG/1
40 TABLET, FILM COATED ORAL DAILY
Qty: 30 TABLET | Refills: 0 | Status: SHIPPED | OUTPATIENT
Start: 2019-09-03 | End: 2019-09-05 | Stop reason: SDUPTHER

## 2019-09-05 DIAGNOSIS — E78.00 HYPERCHOLESTEROLEMIA: ICD-10-CM

## 2019-09-05 RX ORDER — ATORVASTATIN CALCIUM 40 MG/1
40 TABLET, FILM COATED ORAL DAILY
Qty: 90 TABLET | Refills: 3 | Status: SHIPPED | OUTPATIENT
Start: 2019-09-05 | End: 2020-12-02

## 2019-09-20 DIAGNOSIS — I10 ESSENTIAL HYPERTENSION: Primary | ICD-10-CM

## 2019-09-20 DIAGNOSIS — E78.5 HYPERLIPIDEMIA, UNSPECIFIED HYPERLIPIDEMIA TYPE: ICD-10-CM

## 2019-09-26 LAB
ALBUMIN SERPL-MCNC: 4.6 G/DL (ref 3.5–5.2)
ALBUMIN/GLOB SERPL: 2.1 G/DL
ALP SERPL-CCNC: 71 U/L (ref 39–117)
ALT SERPL-CCNC: 23 U/L (ref 1–33)
AST SERPL-CCNC: 18 U/L (ref 1–32)
BASOPHILS # BLD AUTO: 0.04 10*3/MM3 (ref 0–0.2)
BASOPHILS NFR BLD AUTO: 0.6 % (ref 0–1.5)
BILIRUB SERPL-MCNC: 0.4 MG/DL (ref 0.2–1.2)
BUN SERPL-MCNC: 19 MG/DL (ref 8–23)
BUN/CREAT SERPL: 24.1 (ref 7–25)
CALCIUM SERPL-MCNC: 9.5 MG/DL (ref 8.6–10.5)
CHLORIDE SERPL-SCNC: 104 MMOL/L (ref 98–107)
CHOLEST SERPL-MCNC: 155 MG/DL (ref 0–200)
CO2 SERPL-SCNC: 28.5 MMOL/L (ref 22–29)
CREAT SERPL-MCNC: 0.79 MG/DL (ref 0.57–1)
EOSINOPHIL # BLD AUTO: 0.21 10*3/MM3 (ref 0–0.4)
EOSINOPHIL NFR BLD AUTO: 3.1 % (ref 0.3–6.2)
ERYTHROCYTE [DISTWIDTH] IN BLOOD BY AUTOMATED COUNT: 13.6 % (ref 12.3–15.4)
GLOBULIN SER CALC-MCNC: 2.2 GM/DL
GLUCOSE SERPL-MCNC: 100 MG/DL (ref 65–99)
HCT VFR BLD AUTO: 48.5 % (ref 34–46.6)
HDLC SERPL-MCNC: 43 MG/DL (ref 40–60)
HGB BLD-MCNC: 15.7 G/DL (ref 12–15.9)
IMM GRANULOCYTES # BLD AUTO: 0.02 10*3/MM3 (ref 0–0.05)
IMM GRANULOCYTES NFR BLD AUTO: 0.3 % (ref 0–0.5)
LDLC SERPL CALC-MCNC: 94 MG/DL (ref 0–100)
LYMPHOCYTES # BLD AUTO: 1.84 10*3/MM3 (ref 0.7–3.1)
LYMPHOCYTES NFR BLD AUTO: 27.5 % (ref 19.6–45.3)
MCH RBC QN AUTO: 28.4 PG (ref 26.6–33)
MCHC RBC AUTO-ENTMCNC: 32.4 G/DL (ref 31.5–35.7)
MCV RBC AUTO: 87.9 FL (ref 79–97)
MONOCYTES # BLD AUTO: 0.5 10*3/MM3 (ref 0.1–0.9)
MONOCYTES NFR BLD AUTO: 7.5 % (ref 5–12)
NEUTROPHILS # BLD AUTO: 4.09 10*3/MM3 (ref 1.7–7)
NEUTROPHILS NFR BLD AUTO: 61 % (ref 42.7–76)
NRBC BLD AUTO-RTO: 0 /100 WBC (ref 0–0.2)
PLATELET # BLD AUTO: 280 10*3/MM3 (ref 140–450)
POTASSIUM SERPL-SCNC: 4.7 MMOL/L (ref 3.5–5.2)
PROT SERPL-MCNC: 6.8 G/DL (ref 6–8.5)
RBC # BLD AUTO: 5.52 10*6/MM3 (ref 3.77–5.28)
SODIUM SERPL-SCNC: 145 MMOL/L (ref 136–145)
TRIGL SERPL-MCNC: 88 MG/DL (ref 0–150)
TSH SERPL DL<=0.005 MIU/L-ACNC: 1.05 UIU/ML (ref 0.27–4.2)
VLDLC SERPL CALC-MCNC: 17.6 MG/DL
WBC # BLD AUTO: 6.7 10*3/MM3 (ref 3.4–10.8)

## 2019-09-30 ENCOUNTER — OFFICE VISIT (OUTPATIENT)
Dept: FAMILY MEDICINE CLINIC | Facility: CLINIC | Age: 69
End: 2019-09-30

## 2019-09-30 VITALS
BODY MASS INDEX: 27.83 KG/M2 | HEART RATE: 60 BPM | SYSTOLIC BLOOD PRESSURE: 126 MMHG | HEIGHT: 64 IN | TEMPERATURE: 98.2 F | DIASTOLIC BLOOD PRESSURE: 70 MMHG | WEIGHT: 163 LBS | OXYGEN SATURATION: 97 % | RESPIRATION RATE: 16 BRPM

## 2019-09-30 DIAGNOSIS — I10 ESSENTIAL HYPERTENSION: ICD-10-CM

## 2019-09-30 DIAGNOSIS — Z11.59 ENCOUNTER FOR HEPATITIS C SCREENING TEST FOR LOW RISK PATIENT: ICD-10-CM

## 2019-09-30 DIAGNOSIS — M85.80 OSTEOPENIA, UNSPECIFIED LOCATION: ICD-10-CM

## 2019-09-30 DIAGNOSIS — Z87.891 FORMER SMOKER: ICD-10-CM

## 2019-09-30 DIAGNOSIS — R42 POSITIONAL LIGHTHEADEDNESS: ICD-10-CM

## 2019-09-30 DIAGNOSIS — Z12.2 ENCOUNTER FOR SCREENING FOR LUNG CANCER: ICD-10-CM

## 2019-09-30 DIAGNOSIS — Z00.00 ANNUAL PHYSICAL EXAM: Primary | ICD-10-CM

## 2019-09-30 PROCEDURE — 99397 PER PM REEVAL EST PAT 65+ YR: CPT | Performed by: NURSE PRACTITIONER

## 2019-09-30 PROCEDURE — 93000 ELECTROCARDIOGRAM COMPLETE: CPT | Performed by: NURSE PRACTITIONER

## 2019-09-30 NOTE — PATIENT INSTRUCTIONS
How to Perform the Epley Maneuver  The Epley maneuver is an exercise that relieves symptoms of vertigo. Vertigo is the feeling that you or your surroundings are moving when they are not. When you feel vertigo, you may feel like the room is spinning and have trouble walking. Dizziness is a little different than vertigo. When you are dizzy, you may feel unsteady or light-headed.  You can do this maneuver at home whenever you have symptoms of vertigo. You can do it up to 3 times a day until your symptoms go away.  Even though the Epley maneuver may relieve your vertigo for a few weeks, it is possible that your symptoms will return. This maneuver relieves vertigo, but it does not relieve dizziness.  What are the risks?  If it is done correctly, the Epley maneuver is considered safe. Sometimes it can lead to dizziness or nausea that goes away after a short time. If you develop other symptoms, such as changes in vision, weakness, or numbness, stop doing the maneuver and call your health care provider.  How to perform the Epley maneuver  1. Sit on the edge of a bed or table with your back straight and your legs extended or hanging over the edge of the bed or table.  2. Turn your head long-term toward the affected ear or side.  3. Lie backward quickly with your head turned until you are lying flat on your back. You may want to position a pillow under your shoulders.  4. Hold this position for 30 seconds. You may experience an attack of vertigo. This is normal.  5. Turn your head to the opposite direction until your unaffected ear is facing the floor.  6. Hold this position for 30 seconds. You may experience an attack of vertigo. This is normal. Hold this position until the vertigo stops.  7. Turn your whole body to the same side as your head. Hold for another 30 seconds.  8. Sit back up.  You can repeat this exercise up to 3 times a day.  Follow these instructions at home:  · After doing the Epley maneuver, you can return to  your normal activities.  · Ask your health care provider if there is anything you should do at home to prevent vertigo. He or she may recommend that you:  ? Keep your head raised (elevated) with two or more pillows while you sleep.  ? Do not sleep on the side of your affected ear.  ? Get up slowly from bed.  ? Avoid sudden movements during the day.  ? Avoid extreme head movement, like looking up or bending over.  Contact a health care provider if:  · Your vertigo gets worse.  · You have other symptoms, including:  ? Nausea.  ? Vomiting.  ? Headache.  Get help right away if:  · You have vision changes.  · You have a severe or worsening headache or neck pain.  · You cannot stop vomiting.  · You have new numbness or weakness in any part of your body.  Summary  · Vertigo is the feeling that you or your surroundings are moving when they are not.  · The Epley maneuver is an exercise that relieves symptoms of vertigo.  · If the Epley maneuver is done correctly, it is considered safe. You can do it up to 3 times a day.  This information is not intended to replace advice given to you by your health care provider. Make sure you discuss any questions you have with your health care provider.  Document Released: 12/23/2014 Document Revised: 11/07/2017 Document Reviewed: 11/07/2017  Elsevier Interactive Patient Education © 2019 [employer]

## 2019-09-30 NOTE — PROGRESS NOTES
Patient ID: Senait Moore is a 69 y.o. female     Subjective     Chief Complaint   Patient presents with   • Annual Exam       History of Present Illness    Senait Moore presents to the office today for annual physical exam.  Since she was last seen, she feels she has been doing well except for intermittent episodes of lightheadedness.  This is been ongoing for approximately 6 months.  Occurs when bending down to  objects and stands up too quickly.  Also occurs when laying in bed and sits up too quickly.  Symptoms last briefly.  She does have history of allergies and takes Zyrtec daily.  Diet:  [employer] Diet. Not eating bread. Lost 17 pounds since July 19, 2019.    Exercise:  Walks at work.    Takes potassium and magnesium to control leg cramping at night with good control of symptoms.    Plans to retire March 2020.  She is s/p lumpectomy on 11/13/2014 with negative sentinel lymph nodes s/p radiation therapy which was completed in March 2015.  She was also started on Arimidex 2015 however was stopped in July of this year due to side effects. Declined any further endocrine therapy.  She is 5 years out without evidence of disease reoccurrence.  Most recent mammogram completed in September 2019 which was negative.  Former smoker of at least 1 ppd for 40+ years, quit 4 1/2 years ago.    Hypertension: Managed with hydrochlorothiazide 25 mg daily.  Hyperlipidemia managed with atorvastatin 40 mg daily.  COPD well controlled with Dulera inhaler.  She also has albuterol inhaler to use as needed.  She denies any complaints of fever, chills, cough, chest pain, shortness of air, abdominal pain, nausea, or any other concerns.     The following portions of the patient's history were reviewed and updated as appropriate: allergies, current medications, past family history, past medical history, past social history, past surgical history and problem list.       Review of Systems   Constitution: Negative.   HENT:  Negative.    Eyes: Negative.    Cardiovascular: Negative.    Respiratory: Negative.    Endocrine: Negative.    Hematologic/Lymphatic: Negative.    Skin: Negative.    Musculoskeletal: Negative.    Gastrointestinal: Negative.    Genitourinary: Negative.    Neurological: Positive for light-headedness.   Psychiatric/Behavioral: Negative.        Vitals:    09/30/19 1035   BP: 126/70   Pulse: 60   Resp: 16   Temp: 98.2 °F (36.8 °C)   SpO2: 97%       Documented weights    09/30/19 1035   Weight: 73.9 kg (163 lb)     Body mass index is 28.42 kg/m².    Results for orders placed or performed in visit on 09/20/19   TSH   Result Value Ref Range    TSH 1.050 0.270 - 4.200 uIU/mL   Lipid Panel   Result Value Ref Range    Total Cholesterol 155 0 - 200 mg/dL    Triglycerides 88 0 - 150 mg/dL    HDL Cholesterol 43 40 - 60 mg/dL    VLDL Cholesterol 17.6 mg/dL    LDL Cholesterol  94 0 - 100 mg/dL   Comprehensive Metabolic Panel   Result Value Ref Range    Glucose 100 (H) 65 - 99 mg/dL    BUN 19 8 - 23 mg/dL    Creatinine 0.79 0.57 - 1.00 mg/dL    eGFR Non African Am 73 >60 mL/min/1.73    eGFR African Am 88 >60 mL/min/1.73    BUN/Creatinine Ratio 24.1 7.0 - 25.0    Sodium 145 136 - 145 mmol/L    Potassium 4.7 3.5 - 5.2 mmol/L    Chloride 104 98 - 107 mmol/L    Total CO2 28.5 22.0 - 29.0 mmol/L    Calcium 9.5 8.6 - 10.5 mg/dL    Total Protein 6.8 6.0 - 8.5 g/dL    Albumin 4.60 3.50 - 5.20 g/dL    Globulin 2.2 gm/dL    A/G Ratio 2.1 g/dL    Total Bilirubin 0.4 0.2 - 1.2 mg/dL    Alkaline Phosphatase 71 39 - 117 U/L    AST (SGOT) 18 1 - 32 U/L    ALT (SGPT) 23 1 - 33 U/L   CBC & Differential   Result Value Ref Range    WBC 6.70 3.40 - 10.80 10*3/mm3    RBC 5.52 (H) 3.77 - 5.28 10*6/mm3    Hemoglobin 15.7 12.0 - 15.9 g/dL    Hematocrit 48.5 (H) 34.0 - 46.6 %    MCV 87.9 79.0 - 97.0 fL    MCH 28.4 26.6 - 33.0 pg    MCHC 32.4 31.5 - 35.7 g/dL    RDW 13.6 12.3 - 15.4 %    Platelets 280 140 - 450 10*3/mm3    Neutrophil Rel % 61.0 42.7 - 76.0 %     Lymphocyte Rel % 27.5 19.6 - 45.3 %    Monocyte Rel % 7.5 5.0 - 12.0 %    Eosinophil Rel % 3.1 0.3 - 6.2 %    Basophil Rel % 0.6 0.0 - 1.5 %    Neutrophils Absolute 4.09 1.70 - 7.00 10*3/mm3    Lymphocytes Absolute 1.84 0.70 - 3.10 10*3/mm3    Monocytes Absolute 0.50 0.10 - 0.90 10*3/mm3    Eosinophils Absolute 0.21 0.00 - 0.40 10*3/mm3    Basophils Absolute 0.04 0.00 - 0.20 10*3/mm3    Immature Granulocyte Rel % 0.3 0.0 - 0.5 %    Immature Grans Absolute 0.02 0.00 - 0.05 10*3/mm3    nRBC 0.0 0.0 - 0.2 /100 WBC       Objective       Physical Exam   Constitutional: She is oriented to person, place, and time. Vital signs are normal. She appears well-developed.   HENT:   Head: Normocephalic and atraumatic.   Right Ear: Tympanic membrane normal.   Left Ear: Tympanic membrane normal.   Mouth/Throat: Oropharynx is clear and moist.   Eyes: EOM are normal. Pupils are equal, round, and reactive to light.   Neck: Normal range of motion. Neck supple.   Cardiovascular: Normal rate, regular rhythm, normal heart sounds and intact distal pulses.   No murmur heard.  Pulmonary/Chest: Effort normal and breath sounds normal. She has no wheezes. She has no rhonchi. She has no rales. Right breast exhibits no inverted nipple, no mass, no nipple discharge, no skin change and no tenderness. Left breast exhibits no inverted nipple, no mass, no nipple discharge, no skin change and no tenderness.   Abdominal: Soft. Bowel sounds are normal. There is no hepatosplenomegaly. There is no tenderness.   Musculoskeletal: Normal range of motion. She exhibits no edema or tenderness.   Neurological: She is alert and oriented to person, place, and time. She has normal strength.   Skin: Skin is warm and dry. No rash noted. No cyanosis or erythema.   Psychiatric: She has a normal mood and affect. Her behavior is normal.       ECG 12 Lead  Date/Time: 9/30/2019 12:01 PM  Performed by: Head, Maria Elena N, APRN  Authorized by: Head, SURYA Carreon   Previous ECG: no  previous ECG available  Rhythm: sinus bradycardia  Rate: bradycardic  BPM: 58  Conduction: conduction normal  ST Segments: ST segments normal  T Waves: T waves normal  QRS axis: normal  Other: no other findings    Clinical impression: normal ECG        Orthostatics completed which was stable.  See results under Vitals.         Assessment/Plan     Assessment/Plan     Senait was seen today for annual exam.    Diagnoses and all orders for this visit:    Annual physical exam  -     CBC & Differential; Future  -     Comprehensive Metabolic Panel; Future  -     Lipid Panel; Future  -     TSH; Future    Positional lightheadedness  -     ECG 12 Lead    Encounter for screening for lung cancer  -     CT Chest Low Dose Wo; Future    Former smoker  -     CT Chest Low Dose Wo; Future    Osteopenia, unspecified location  -     DEXA Bone Density Axial    Essential hypertension  -     CBC & Differential; Future  -     Comprehensive Metabolic Panel; Future  -     Lipid Panel; Future  -     TSH; Future    Encounter for hepatitis C screening test for low risk patient  -     Hepatitis C Antibody; Future      Summary:  Seniat Moore presents office today for annual physical exam.  Reviewed recent labs along with patient which all remained stable.  No changes in medications today.  She is due for DEXA scan which will be ordered.  Also due to her smoking history, recommend low-dose CT chest for lung cancer screening.  She agrees.  Concerning her lightheadedness, EKG completed in office today was normal.  Her orthostatics were also normal.  Instructed more than likely related to allergies.  Continue Zyrtec daily and may also benefit from starting Flonase 2 sprays each nostril daily.  I also gave her instructions for Epley maneuvers to complete at home.  She knows to contact us if symptoms do not improve or worsen.  She will need to return in 1 year for next annual physical with fasting labs.    In the meantime, instructed to contact  us sooner for any problems or concerns.    Patient Counseling:  --Nutrition: Stressed importance of moderation in sodium/caffeine intake, saturated fat and cholesterol.  Discussed caloric balance, sufficient intake of fresh fruits, vegetables, fiber, calcium, iron.  --Discussed the new recommendation against daily use of baby aspirin for primary prevention in low risk patients.  --Exercise: Stressed the importance of regular exercise.   --Substance Abuse: Discussed cessation/primary prevention of tobacco, alcohol, or other drug use; driving or other dangerous activities under the influence.    --Dental health: Discussed importance of regular tooth brushing, flossing, and    dental visits.  -- suggested having eyes and vision checked if needed or past due.  --Immunizations reviewed.  Waits until late October for flu vaccine.    --Discussed benefits of screening colonoscopy. FOREIGN Arechiga, APRN  Family Medicine  [facility]  09/30/19  10:48 AM

## 2019-10-04 DIAGNOSIS — I10 BENIGN ESSENTIAL HYPERTENSION: ICD-10-CM

## 2019-10-04 RX ORDER — HYDROCHLOROTHIAZIDE 25 MG/1
25 TABLET ORAL DAILY
Qty: 90 TABLET | Refills: 1 | Status: SHIPPED | OUTPATIENT
Start: 2019-10-04 | End: 2020-05-04

## 2019-10-11 RX ORDER — FENTANYL CITRATE 50 UG/ML
INJECTION, SOLUTION INTRAMUSCULAR; INTRAVENOUS
Status: DISPENSED
Start: 2019-10-11 | End: 2019-10-12

## 2019-11-01 DIAGNOSIS — J43.8 OTHER EMPHYSEMA (HCC): ICD-10-CM

## 2019-11-18 ENCOUNTER — APPOINTMENT (OUTPATIENT)
Dept: CT IMAGING | Facility: HOSPITAL | Age: 69
End: 2019-11-18

## 2020-02-13 DIAGNOSIS — R06.09 DYSPNEA ON EXERTION: ICD-10-CM

## 2020-02-13 RX ORDER — ALBUTEROL SULFATE 90 UG/1
AEROSOL, METERED RESPIRATORY (INHALATION)
Qty: 18 G | Refills: 0 | Status: SHIPPED | OUTPATIENT
Start: 2020-02-13 | End: 2020-07-13

## 2020-03-29 DIAGNOSIS — J43.8 OTHER EMPHYSEMA (HCC): ICD-10-CM

## 2020-03-30 RX ORDER — MOMETASONE FUROATE AND FORMOTEROL FUMARATE DIHYDRATE 200; 5 UG/1; UG/1
AEROSOL RESPIRATORY (INHALATION)
Qty: 13 G | Refills: 3 | Status: SHIPPED | OUTPATIENT
Start: 2020-03-30 | End: 2020-10-23

## 2020-05-04 DIAGNOSIS — I10 BENIGN ESSENTIAL HYPERTENSION: ICD-10-CM

## 2020-05-04 RX ORDER — HYDROCHLOROTHIAZIDE 25 MG/1
25 TABLET ORAL DAILY
Qty: 90 TABLET | Refills: 0 | Status: SHIPPED | OUTPATIENT
Start: 2020-05-04 | End: 2020-08-27 | Stop reason: SDUPTHER

## 2020-07-13 DIAGNOSIS — R06.09 DYSPNEA ON EXERTION: ICD-10-CM

## 2020-07-13 RX ORDER — ALBUTEROL SULFATE 90 UG/1
AEROSOL, METERED RESPIRATORY (INHALATION)
Qty: 18 G | Refills: 0 | Status: SHIPPED | OUTPATIENT
Start: 2020-07-13 | End: 2021-03-05

## 2020-08-22 DIAGNOSIS — I10 BENIGN ESSENTIAL HYPERTENSION: ICD-10-CM

## 2020-08-27 DIAGNOSIS — I10 BENIGN ESSENTIAL HYPERTENSION: ICD-10-CM

## 2020-08-27 RX ORDER — HYDROCHLOROTHIAZIDE 25 MG/1
TABLET ORAL
Qty: 90 TABLET | Refills: 0 | OUTPATIENT
Start: 2020-08-27

## 2020-08-27 RX ORDER — HYDROCHLOROTHIAZIDE 25 MG/1
25 TABLET ORAL DAILY
Qty: 90 TABLET | Refills: 0 | Status: SHIPPED | OUTPATIENT
Start: 2020-08-27 | End: 2020-12-03

## 2020-09-22 DIAGNOSIS — Z11.59 ENCOUNTER FOR HEPATITIS C SCREENING TEST FOR LOW RISK PATIENT: ICD-10-CM

## 2020-09-22 DIAGNOSIS — I10 ESSENTIAL HYPERTENSION: ICD-10-CM

## 2020-09-22 DIAGNOSIS — Z00.00 ANNUAL PHYSICAL EXAM: ICD-10-CM

## 2020-10-03 LAB
ALBUMIN SERPL-MCNC: 4.7 G/DL (ref 3.5–5.2)
ALBUMIN/GLOB SERPL: 2.6 G/DL
ALP SERPL-CCNC: 90 U/L (ref 39–117)
ALT SERPL-CCNC: 28 U/L (ref 1–33)
AST SERPL-CCNC: 21 U/L (ref 1–32)
BASOPHILS # BLD AUTO: 0.05 10*3/MM3 (ref 0–0.2)
BASOPHILS NFR BLD AUTO: 0.7 % (ref 0–1.5)
BILIRUB SERPL-MCNC: 0.3 MG/DL (ref 0–1.2)
BUN SERPL-MCNC: 19 MG/DL (ref 8–23)
BUN/CREAT SERPL: 22.9 (ref 7–25)
CALCIUM SERPL-MCNC: 9.2 MG/DL (ref 8.6–10.5)
CHLORIDE SERPL-SCNC: 103 MMOL/L (ref 98–107)
CHOLEST SERPL-MCNC: 215 MG/DL (ref 0–200)
CO2 SERPL-SCNC: 27 MMOL/L (ref 22–29)
CREAT SERPL-MCNC: 0.83 MG/DL (ref 0.57–1)
EOSINOPHIL # BLD AUTO: 0.26 10*3/MM3 (ref 0–0.4)
EOSINOPHIL NFR BLD AUTO: 3.4 % (ref 0.3–6.2)
ERYTHROCYTE [DISTWIDTH] IN BLOOD BY AUTOMATED COUNT: 13.5 % (ref 12.3–15.4)
GLOBULIN SER CALC-MCNC: 1.8 GM/DL
GLUCOSE SERPL-MCNC: 112 MG/DL (ref 65–99)
HCT VFR BLD AUTO: 44.4 % (ref 34–46.6)
HCV AB S/CO SERPL IA: <0.1 S/CO RATIO (ref 0–0.9)
HDLC SERPL-MCNC: 46 MG/DL (ref 40–60)
HGB BLD-MCNC: 14.8 G/DL (ref 12–15.9)
IMM GRANULOCYTES # BLD AUTO: 0.04 10*3/MM3 (ref 0–0.05)
IMM GRANULOCYTES NFR BLD AUTO: 0.5 % (ref 0–0.5)
LDLC SERPL CALC-MCNC: 137 MG/DL (ref 0–100)
LYMPHOCYTES # BLD AUTO: 2 10*3/MM3 (ref 0.7–3.1)
LYMPHOCYTES NFR BLD AUTO: 26.4 % (ref 19.6–45.3)
MCH RBC QN AUTO: 29.1 PG (ref 26.6–33)
MCHC RBC AUTO-ENTMCNC: 33.3 G/DL (ref 31.5–35.7)
MCV RBC AUTO: 87.2 FL (ref 79–97)
MONOCYTES # BLD AUTO: 0.72 10*3/MM3 (ref 0.1–0.9)
MONOCYTES NFR BLD AUTO: 9.5 % (ref 5–12)
NEUTROPHILS # BLD AUTO: 4.51 10*3/MM3 (ref 1.7–7)
NEUTROPHILS NFR BLD AUTO: 59.5 % (ref 42.7–76)
NRBC BLD AUTO-RTO: 0.1 /100 WBC (ref 0–0.2)
PLATELET # BLD AUTO: 295 10*3/MM3 (ref 140–450)
POTASSIUM SERPL-SCNC: 4.4 MMOL/L (ref 3.5–5.2)
PROT SERPL-MCNC: 6.5 G/DL (ref 6–8.5)
RBC # BLD AUTO: 5.09 10*6/MM3 (ref 3.77–5.28)
SODIUM SERPL-SCNC: 141 MMOL/L (ref 136–145)
TRIGL SERPL-MCNC: 161 MG/DL (ref 0–150)
TSH SERPL DL<=0.005 MIU/L-ACNC: 0.88 UIU/ML (ref 0.27–4.2)
VLDLC SERPL CALC-MCNC: 32.2 MG/DL
WBC # BLD AUTO: 7.58 10*3/MM3 (ref 3.4–10.8)

## 2020-10-09 ENCOUNTER — OFFICE VISIT (OUTPATIENT)
Dept: FAMILY MEDICINE CLINIC | Facility: CLINIC | Age: 70
End: 2020-10-09

## 2020-10-09 VITALS
HEIGHT: 64 IN | OXYGEN SATURATION: 95 % | TEMPERATURE: 98 F | DIASTOLIC BLOOD PRESSURE: 86 MMHG | WEIGHT: 181 LBS | RESPIRATION RATE: 16 BRPM | BODY MASS INDEX: 30.9 KG/M2 | HEART RATE: 96 BPM | SYSTOLIC BLOOD PRESSURE: 160 MMHG

## 2020-10-09 DIAGNOSIS — Z00.00 INITIAL MEDICARE ANNUAL WELLNESS VISIT: Primary | ICD-10-CM

## 2020-10-09 DIAGNOSIS — Z23 IMMUNIZATION DUE: ICD-10-CM

## 2020-10-09 DIAGNOSIS — Z23 FLU VACCINE NEED: ICD-10-CM

## 2020-10-09 DIAGNOSIS — E78.00 HYPERCHOLESTEROLEMIA: ICD-10-CM

## 2020-10-09 DIAGNOSIS — E66.01 MORBID (SEVERE) OBESITY DUE TO EXCESS CALORIES (HCC): ICD-10-CM

## 2020-10-09 DIAGNOSIS — Z12.31 ENCOUNTER FOR SCREENING MAMMOGRAM FOR MALIGNANT NEOPLASM OF BREAST: ICD-10-CM

## 2020-10-09 DIAGNOSIS — Z12.39 ENCOUNTER FOR SCREENING FOR MALIGNANT NEOPLASM OF BREAST, UNSPECIFIED SCREENING MODALITY: ICD-10-CM

## 2020-10-09 DIAGNOSIS — I10 BENIGN ESSENTIAL HYPERTENSION: ICD-10-CM

## 2020-10-09 DIAGNOSIS — R73.9 HYPERGLYCEMIA: ICD-10-CM

## 2020-10-09 PROCEDURE — 90694 VACC AIIV4 NO PRSRV 0.5ML IM: CPT | Performed by: NURSE PRACTITIONER

## 2020-10-09 PROCEDURE — G0402 INITIAL PREVENTIVE EXAM: HCPCS | Performed by: NURSE PRACTITIONER

## 2020-10-09 PROCEDURE — G0008 ADMIN INFLUENZA VIRUS VAC: HCPCS | Performed by: NURSE PRACTITIONER

## 2020-10-09 PROCEDURE — 99213 OFFICE O/P EST LOW 20 MIN: CPT | Performed by: NURSE PRACTITIONER

## 2020-10-09 NOTE — PROGRESS NOTES
The ABCs of the Annual Wellness Visit  Initial Medicare Wellness Visit    Chief Complaint   Patient presents with   • Medicare Wellness-subsequent       Subjective   History of Present Illness:  Senait Moore is a 70 y.o. female who presents for an Initial Medicare Wellness Visit along with continued management concerning hypertension and hyperlipidemia.  Since she was last seen, she feels she has been doing well.  She recently retired from  and February.  Admits to not getting as much exercise as previously.  Also admits to increased alcohol intake due to vacation and current pandemic.    HEALTH RISK ASSESSMENT    Recent Hospitalizations: NO      Current Medical Providers:  Patient Care Team:  Head, SURYA Carreon as PCP - General (Nurse Practitioner)  Radha Iglesias MD (Inactive) as Consulting Physician (Hematology and Oncology)  July Valdez MD as Referring Physician (Breast Surgery)    Smoking Status:  Social History     Tobacco Use   Smoking Status Former Smoker   • Packs/day: 1.00   • Years: 42.00   • Pack years: 42.00   • Types: Cigarettes   • Quit date: 2015   • Years since quittin.7   Smokeless Tobacco Never Used       Alcohol Consumption:  Social History     Substance and Sexual Activity   Alcohol Use No    Comment: Significant H/O past alcohol use, but no current use       Depression Screen:   PHQ-2/PHQ-9 Depression Screening 10/9/2020   Little interest or pleasure in doing things 0   Feeling down, depressed, or hopeless 0   Trouble falling or staying asleep, or sleeping too much 3   Feeling tired or having little energy 0   Poor appetite or overeating 0   Feeling bad about yourself - or that you are a failure or have let yourself or your family down 0   Trouble concentrating on things, such as reading the newspaper or watching television 0   Moving or speaking so slowly that other people could have noticed. Or the opposite - being so fidgety or restless that you have been moving  around a lot more than usual 0   Thoughts that you would be better off dead, or of hurting yourself in some way 0   Total Score 3       Fall Risk Screen:  NEIDA Fall Risk Assessment was completed, and patient is at LOW risk for falls.Assessment completed on:10/9/2020    Health Habits and Functional and Cognitive Screening:  Functional & Cognitive Status 10/9/2020   Do you have difficulty preparing food and eating? No   Do you have difficulty bathing yourself, getting dressed or grooming yourself? No   Do you have difficulty using the toilet? No   Do you have difficulty moving around from place to place? No   Do you have trouble with steps or getting out of a bed or a chair? No   Current Diet Well Balanced Diet   Dental Exam Up to date   Eye Exam Up to date   Exercise (times per week) 0 times per week   Do you need help using the phone?  No   Are you deaf or do you have serious difficulty hearing?  No   Do you need help with transportation? No   Do you need help shopping? No   Do you need help preparing meals?  No   Do you need help with housework?  No   Do you need help with laundry? No   Do you need help taking your medications? No   Do you need help managing money? No   Do you ever drive or ride in a car without wearing a seat belt? No   Have you felt unusual stress, anger or loneliness in the last month? No   Who do you live with? Spouse   If you need help, do you have trouble finding someone available to you? No   Do you have difficulty concentrating, remembering or making decisions? Yes         Does the patient have evidence of cognitive impairment? No    Asprin use counseling:Does not need ASA (and currently is not on it)    Age-appropriate Screening Schedule:  Refer to the list below for future screening recommendations based on patient's age, sex and/or medical conditions. Orders for these recommended tests are listed in the plan section. The patient has been provided with a written plan.    Health  Maintenance   Topic Date Due   • MAMMOGRAM  09/03/2020   • DXA SCAN  04/09/2021 (Originally 6/6/2019)   • ZOSTER VACCINE (2 of 2) 10/18/2021 (Originally 2/26/2014)   • LIPID PANEL  10/02/2021   • COLONOSCOPY  11/02/2025   • TDAP/TD VACCINES (3 - Td) 09/12/2026   • INFLUENZA VACCINE  Completed          The following portions of the patient's history were reviewed and updated as appropriate: allergies, current medications, past family history, past medical history, past social history, past surgical history and problem list.    Outpatient Medications Prior to Visit   Medication Sig Dispense Refill   • ALBUTEROL SULFATE  (90 Base) MCG/ACT inhaler INHALE TWO PUFFS BY MOUTH EVERY 4 HOURS AS NEEDED FOR WHEEZING OR SHORTNESS OF AIR 18 g 0   • atorvastatin (LIPITOR) 40 MG tablet Take 1 tablet by mouth Daily. 90 tablet 3   • benzonatate (TESSALON) 200 MG capsule Take 1 capsule by mouth 3 (Three) Times a Day As Needed for Cough. 30 capsule 0   • Cetirizine HCl 10 MG capsule Take  by mouth.     • Cholecalciferol (VITAMIN D-3) 1000 UNITS capsule Take 1 capsule by mouth.     • DULERA 200-5 MCG/ACT inhaler INHALE 2 PUFFS TWO TIMES A DAY 13 g 3   • hydroCHLOROthiazide (HYDRODIURIL) 25 MG tablet Take 1 tablet by mouth Daily. 90 tablet 0   • Misc Natural Products ([employer] GLUCOS-CHONDROIT-MSM DS PO) Take  by mouth.     • Potassium 99 MG tablet Take 99 mg/day by mouth Daily.     • azithromycin (ZITHROMAX Z-SOLOMON) 250 MG tablet Take 2 tablets the first day, then 1 tablet daily for 4 days. 6 tablet 0   • methylPREDNISolone (MEDROL, SOLOMON,) 4 MG tablet Take as directed on package instructions. 21 tablet 0     No facility-administered medications prior to visit.        Patient Active Problem List   Diagnosis   • Benign essential hypertension   • Pulmonary emphysema (CMS/HCC)   • Malignant neoplasm of upper-outer quadrant of right breast in female, estrogen receptor positive (CMS/HCC)   • Secondary erythrocytosis   • Osteopenia   •  "Diverticulosis of intestine   • External hemorrhoids   • Hypercholesterolemia   • Menopause present   • Impaired glucose tolerance   • Seasonal allergic rhinitis   • Vitamin D deficiency       Advanced Care Planning:  ACP discussion was held with the patient during this visit. Patient has an advance directive (not in EMR), copy requested.    Review of Systems   Constitutional: Negative.    HENT: Negative.    Respiratory: Negative.    Cardiovascular: Negative.    Gastrointestinal: Negative.    Endocrine: Negative.    Genitourinary: Negative.    Musculoskeletal: Negative.    Skin: Negative.    Neurological: Negative.    Hematological: Negative.    Psychiatric/Behavioral: Negative.        Compared to one year ago, the patient feels her physical health is worse.  Compared to one year ago, the patient feels her mental health is the same.    Reviewed chart for potential of high risk medication in the elderly: yes  Reviewed chart for potential of harmful drug interactions in the elderly:yes    Objective         Vitals:    10/09/20 1459   BP: 160/86   BP Location: Left arm   Patient Position: Sitting   Cuff Size: Adult   Pulse: 96   Resp: 16   Temp: 98 °F (36.7 °C)   SpO2: 95%   Weight: 82.1 kg (181 lb)   Height: 161.3 cm (63.5\")       Body mass index is 31.56 kg/m².  Discussed the patient's BMI with her. The BMI is above average; BMI management plan is completed.    Physical Exam  Constitutional:       Appearance: She is well-developed.   HENT:      Head: Normocephalic and atraumatic.      Right Ear: Tympanic membrane normal.      Left Ear: Tympanic membrane normal.   Eyes:      Pupils: Pupils are equal, round, and reactive to light.   Neck:      Musculoskeletal: Normal range of motion and neck supple.   Cardiovascular:      Rate and Rhythm: Normal rate and regular rhythm.      Heart sounds: Normal heart sounds. No murmur.   Pulmonary:      Effort: Pulmonary effort is normal.      Breath sounds: Normal breath sounds. No " wheezing, rhonchi or rales.   Abdominal:      General: Bowel sounds are normal.      Palpations: Abdomen is soft.      Tenderness: There is no abdominal tenderness.   Musculoskeletal: Normal range of motion.         General: No tenderness.   Skin:     General: Skin is warm and dry.      Findings: No erythema or rash.   Neurological:      Mental Status: She is alert and oriented to person, place, and time.   Psychiatric:         Behavior: Behavior normal.         Lab Results   Component Value Date     (H) 10/02/2020    CHLPL 215 (H) 10/02/2020    TRIG 161 (H) 10/02/2020    HDL 46 10/02/2020     (H) 10/02/2020    VLDL 32.2 10/02/2020        Assessment/Plan   Medicare Risks and Personalized Health Plan  CMS Preventative Services Quick Reference  Advance Directive Discussion  Breast Cancer/Mammogram Screening  Colon Cancer Screening  Diabetic Lab Screening   Immunizations Discussed/Encouraged (specific immunizations; Influenza and Pneumococcal 23 )  Lung Cancer Risk  Obesity/Overweight     The above risks/problems have been discussed with the patient.  Pertinent information has been shared with the patient in the After Visit Summary.  Follow up plans and orders are seen below in the Assessment/Plan Section.      Senait JAD Moore has been doing well since she was last seen.  Reviewed recent labs along with patient which shows increased elevation in her total and LDL cholesterol.  This is unusual for her she has been taking her Crestor as directed.  She does admit to not watching her diet and has had decrease in exercise.  Instructed we will continue to monitor for now.  She needs to watch her diet and limit foods high in cholesterol.  Incorporate exercise into daily routine.  We will have her return in 6 months for recheck appointment with fasting lipid panel.  Fasting glucose slightly elevated at 112.  We will try to add hemoglobin A1c to recent labs.  Instruct continue to watch sugar and carb intake.   Although the labs remained stable.  Her blood pressure is elevated in office today.  Rechecked at 158/84.  She does admit to not taking her hydrochlorothiazide today.  Instructed to take medication as directed.  Monitor blood pressure at home.  Return to office in approximately 1 to 2 weeks for blood pressure recheck with medical assistant.  No current changes in medications at this time.  Mammogram ordered.  She wants to hold off on low-dose CT lung cancer screening, DEXA scan, and colonoscopy due to current pandemic.  Will readdress during next office visit in 6 months.    Diagnoses and all orders for this visit:    1. Initial Medicare annual wellness visit (Primary)    2. Flu vaccine need  -     Fluad Quad 65+ yrs ([id number])    3. Immunization due  -     pneumococcal polysaccharide 23-valent (PNEUMOVAX-23) vaccine 0.5 mL    4. Benign essential hypertension  -     CBC & Differential; Future  -     Comprehensive Metabolic Panel; Future  -     Lipid Panel; Future  -     Hemoglobin A1c; Future    5. Hypercholesterolemia  -     CBC & Differential; Future  -     Comprehensive Metabolic Panel; Future  -     Lipid Panel; Future  -     Hemoglobin A1c; Future    6. Encounter for screening for malignant neoplasm of breast, unspecified screening modality  -     Mammo Screening Bilateral With CAD; Future    7. Encounter for screening mammogram for malignant neoplasm of breast   -     Mammo Screening Bilateral With CAD; Future    8. Hyperglycemia  -     Comprehensive Metabolic Panel; Future  -     Hemoglobin A1c; Future    9. Morbid (severe) obesity due to excess calories (CMS/HCC)      Follow Up:  Return in about 6 months (around 4/9/2021).     An After Visit Summary and PPPS were given to the patient.     Patient Counseling:  --Nutrition: Stressed importance of moderation in sodium/caffeine intake, saturated fat and cholesterol.  Discussed caloric balance, sufficient intake of fresh fruits, vegetables, fiber,   calcium,  iron.  --Discussed the new recommendation against daily use of baby aspirin for primary prevention in low risk patients.  --Exercise: Stressed the importance of regular exercise by incorporating into daily routine.    --Substance Abuse: Discussed cessation/primary prevention of tobacco, alcohol, or other drug use; driving or other dangerous activities under the influence.    --Dental health: Discussed importance of regular tooth brushing, flossing, and dental visits.  -- Suggested having eyes and vision checked if needed or past due.  --Immunizations reviewed.  --Discussed benefits of screening colonoscopy.      Maria Elena Arechiga, APRN

## 2020-10-10 LAB
HBA1C MFR BLD: 6 % (ref 4.8–5.6)
Lab: NORMAL
WRITTEN AUTHORIZATION: NORMAL

## 2020-10-22 ENCOUNTER — TELEPHONE (OUTPATIENT)
Dept: FAMILY MEDICINE CLINIC | Facility: CLINIC | Age: 70
End: 2020-10-22

## 2020-10-22 DIAGNOSIS — J43.8 OTHER EMPHYSEMA (HCC): ICD-10-CM

## 2020-10-23 DIAGNOSIS — J43.8 OTHER EMPHYSEMA (HCC): ICD-10-CM

## 2020-10-23 RX ORDER — MOMETASONE FUROATE AND FORMOTEROL FUMARATE DIHYDRATE 200; 5 UG/1; UG/1
AEROSOL RESPIRATORY (INHALATION)
Qty: 13 G | Refills: 0 | Status: SHIPPED | OUTPATIENT
Start: 2020-10-23 | End: 2020-12-02

## 2020-10-23 RX ORDER — MOMETASONE FUROATE AND FORMOTEROL FUMARATE DIHYDRATE 200; 5 UG/1; UG/1
AEROSOL RESPIRATORY (INHALATION)
Qty: 13 G | Refills: 2 | OUTPATIENT
Start: 2020-10-23

## 2020-11-27 ENCOUNTER — HOSPITAL ENCOUNTER (OUTPATIENT)
Dept: MAMMOGRAPHY | Facility: HOSPITAL | Age: 70
Discharge: HOME | End: 2020-11-27
Admitting: NURSE PRACTITIONER

## 2020-11-27 DIAGNOSIS — Z12.39 ENCOUNTER FOR SCREENING FOR MALIGNANT NEOPLASM OF BREAST, UNSPECIFIED SCREENING MODALITY: ICD-10-CM

## 2020-11-27 DIAGNOSIS — Z12.31 ENCOUNTER FOR SCREENING MAMMOGRAM FOR MALIGNANT NEOPLASM OF BREAST: ICD-10-CM

## 2020-11-27 PROCEDURE — 77067 SCR MAMMO BI INCL CAD: CPT

## 2020-11-27 PROCEDURE — 77063 BREAST TOMOSYNTHESIS BI: CPT

## 2020-12-01 DIAGNOSIS — J43.8 OTHER EMPHYSEMA (HCC): ICD-10-CM

## 2020-12-01 DIAGNOSIS — I10 BENIGN ESSENTIAL HYPERTENSION: ICD-10-CM

## 2020-12-01 DIAGNOSIS — E78.00 HYPERCHOLESTEROLEMIA: ICD-10-CM

## 2020-12-03 DIAGNOSIS — R92.8 ABNORMALITY OF RIGHT BREAST ON SCREENING MAMMOGRAM: Primary | ICD-10-CM

## 2020-12-03 RX ORDER — ATORVASTATIN CALCIUM 40 MG/1
TABLET, FILM COATED ORAL
Qty: 90 TABLET | Refills: 0 | Status: SHIPPED | OUTPATIENT
Start: 2020-12-03 | End: 2021-03-05

## 2020-12-03 RX ORDER — MOMETASONE FUROATE AND FORMOTEROL FUMARATE DIHYDRATE 200; 5 UG/1; UG/1
AEROSOL RESPIRATORY (INHALATION)
Qty: 13 G | Refills: 1 | Status: SHIPPED | OUTPATIENT
Start: 2020-12-03 | End: 2021-04-05

## 2020-12-03 RX ORDER — HYDROCHLOROTHIAZIDE 25 MG/1
TABLET ORAL
Qty: 90 TABLET | Refills: 0 | Status: SHIPPED | OUTPATIENT
Start: 2020-12-03 | End: 2021-03-05

## 2020-12-31 ENCOUNTER — HOSPITAL ENCOUNTER (OUTPATIENT)
Dept: MAMMOGRAPHY | Facility: HOSPITAL | Age: 70
Discharge: HOME | End: 2020-12-31
Admitting: NURSE PRACTITIONER

## 2020-12-31 ENCOUNTER — HOSPITAL ENCOUNTER (OUTPATIENT)
Dept: ULTRASOUND IMAGING | Facility: HOSPITAL | Age: 70
End: 2020-12-31

## 2020-12-31 DIAGNOSIS — R92.8 ABNORMALITY OF RIGHT BREAST ON SCREENING MAMMOGRAM: ICD-10-CM

## 2020-12-31 PROCEDURE — G0279 TOMOSYNTHESIS, MAMMO: HCPCS

## 2020-12-31 PROCEDURE — 77065 DX MAMMO INCL CAD UNI: CPT

## 2021-03-02 ENCOUNTER — IMMUNIZATION (OUTPATIENT)
Dept: VACCINE CLINIC | Facility: HOSPITAL | Age: 71
End: 2021-03-02

## 2021-03-02 PROCEDURE — 0001A: CPT | Performed by: INTERNAL MEDICINE

## 2021-03-02 PROCEDURE — 91300 HC SARSCOV02 VAC 30MCG/0.3ML IM: CPT | Performed by: INTERNAL MEDICINE

## 2021-03-05 DIAGNOSIS — I10 BENIGN ESSENTIAL HYPERTENSION: ICD-10-CM

## 2021-03-05 DIAGNOSIS — E78.00 HYPERCHOLESTEROLEMIA: ICD-10-CM

## 2021-03-05 DIAGNOSIS — R06.09 DYSPNEA ON EXERTION: ICD-10-CM

## 2021-03-05 RX ORDER — ALBUTEROL SULFATE 90 UG/1
AEROSOL, METERED RESPIRATORY (INHALATION)
Qty: 18 G | Refills: 0 | Status: SHIPPED | OUTPATIENT
Start: 2021-03-05 | End: 2021-04-15 | Stop reason: SDUPTHER

## 2021-03-05 RX ORDER — ATORVASTATIN CALCIUM 40 MG/1
TABLET, FILM COATED ORAL
Qty: 90 TABLET | Refills: 0 | Status: SHIPPED | OUTPATIENT
Start: 2021-03-05 | End: 2021-04-15 | Stop reason: SDUPTHER

## 2021-03-05 RX ORDER — HYDROCHLOROTHIAZIDE 25 MG/1
TABLET ORAL
Qty: 90 TABLET | Refills: 0 | Status: SHIPPED | OUTPATIENT
Start: 2021-03-05 | End: 2021-04-15 | Stop reason: SDUPTHER

## 2021-03-23 ENCOUNTER — IMMUNIZATION (OUTPATIENT)
Dept: VACCINE CLINIC | Facility: HOSPITAL | Age: 71
End: 2021-03-23

## 2021-03-23 PROCEDURE — 91300 HC SARSCOV02 VAC 30MCG/0.3ML IM: CPT | Performed by: INTERNAL MEDICINE

## 2021-03-23 PROCEDURE — 0002A: CPT | Performed by: INTERNAL MEDICINE

## 2021-04-02 DIAGNOSIS — E78.00 HYPERCHOLESTEROLEMIA: ICD-10-CM

## 2021-04-02 DIAGNOSIS — R73.9 HYPERGLYCEMIA: ICD-10-CM

## 2021-04-02 DIAGNOSIS — I10 BENIGN ESSENTIAL HYPERTENSION: ICD-10-CM

## 2021-04-05 DIAGNOSIS — J43.8 OTHER EMPHYSEMA (HCC): ICD-10-CM

## 2021-04-05 RX ORDER — MOMETASONE FUROATE AND FORMOTEROL FUMARATE DIHYDRATE 200; 5 UG/1; UG/1
AEROSOL RESPIRATORY (INHALATION)
Qty: 13 G | Refills: 0 | Status: SHIPPED | OUTPATIENT
Start: 2021-04-05 | End: 2021-04-15 | Stop reason: SDUPTHER

## 2021-04-10 LAB
ALBUMIN SERPL-MCNC: 4.7 G/DL (ref 3.5–5.2)
ALBUMIN/GLOB SERPL: 2 G/DL
ALP SERPL-CCNC: 90 U/L (ref 39–117)
ALT SERPL-CCNC: 22 U/L (ref 1–33)
AST SERPL-CCNC: 18 U/L (ref 1–32)
BASOPHILS # BLD AUTO: 0.05 10*3/MM3 (ref 0–0.2)
BASOPHILS NFR BLD AUTO: 0.7 % (ref 0–1.5)
BILIRUB SERPL-MCNC: 0.5 MG/DL (ref 0–1.2)
BUN SERPL-MCNC: 20 MG/DL (ref 8–23)
BUN/CREAT SERPL: 23 (ref 7–25)
CALCIUM SERPL-MCNC: 9.9 MG/DL (ref 8.6–10.5)
CHLORIDE SERPL-SCNC: 102 MMOL/L (ref 98–107)
CHOLEST SERPL-MCNC: 213 MG/DL (ref 0–200)
CO2 SERPL-SCNC: 30.8 MMOL/L (ref 22–29)
CREAT SERPL-MCNC: 0.87 MG/DL (ref 0.57–1)
EOSINOPHIL # BLD AUTO: 0.24 10*3/MM3 (ref 0–0.4)
EOSINOPHIL NFR BLD AUTO: 3.3 % (ref 0.3–6.2)
ERYTHROCYTE [DISTWIDTH] IN BLOOD BY AUTOMATED COUNT: 13.7 % (ref 12.3–15.4)
GLOBULIN SER CALC-MCNC: 2.4 GM/DL
GLUCOSE SERPL-MCNC: 105 MG/DL (ref 65–99)
HBA1C MFR BLD: 6.3 % (ref 4.8–5.6)
HCT VFR BLD AUTO: 45 % (ref 34–46.6)
HDLC SERPL-MCNC: 50 MG/DL (ref 40–60)
HGB BLD-MCNC: 15.3 G/DL (ref 12–15.9)
IMM GRANULOCYTES # BLD AUTO: 0.02 10*3/MM3 (ref 0–0.05)
IMM GRANULOCYTES NFR BLD AUTO: 0.3 % (ref 0–0.5)
LDLC SERPL CALC-MCNC: 133 MG/DL (ref 0–100)
LYMPHOCYTES # BLD AUTO: 2.11 10*3/MM3 (ref 0.7–3.1)
LYMPHOCYTES NFR BLD AUTO: 28.9 % (ref 19.6–45.3)
MCH RBC QN AUTO: 29.4 PG (ref 26.6–33)
MCHC RBC AUTO-ENTMCNC: 34 G/DL (ref 31.5–35.7)
MCV RBC AUTO: 86.4 FL (ref 79–97)
MONOCYTES # BLD AUTO: 0.54 10*3/MM3 (ref 0.1–0.9)
MONOCYTES NFR BLD AUTO: 7.4 % (ref 5–12)
NEUTROPHILS # BLD AUTO: 4.33 10*3/MM3 (ref 1.7–7)
NEUTROPHILS NFR BLD AUTO: 59.4 % (ref 42.7–76)
NRBC BLD AUTO-RTO: 0 /100 WBC (ref 0–0.2)
PLATELET # BLD AUTO: 292 10*3/MM3 (ref 140–450)
POTASSIUM SERPL-SCNC: 4.2 MMOL/L (ref 3.5–5.2)
PROT SERPL-MCNC: 7.1 G/DL (ref 6–8.5)
RBC # BLD AUTO: 5.21 10*6/MM3 (ref 3.77–5.28)
SODIUM SERPL-SCNC: 143 MMOL/L (ref 136–145)
TRIGL SERPL-MCNC: 170 MG/DL (ref 0–150)
VLDLC SERPL CALC-MCNC: 30 MG/DL (ref 5–40)
WBC # BLD AUTO: 7.29 10*3/MM3 (ref 3.4–10.8)

## 2021-04-15 ENCOUNTER — OFFICE VISIT (OUTPATIENT)
Dept: FAMILY MEDICINE CLINIC | Facility: CLINIC | Age: 71
End: 2021-04-15

## 2021-04-15 VITALS
BODY MASS INDEX: 30.05 KG/M2 | DIASTOLIC BLOOD PRESSURE: 90 MMHG | RESPIRATION RATE: 16 BRPM | OXYGEN SATURATION: 93 % | SYSTOLIC BLOOD PRESSURE: 160 MMHG | WEIGHT: 176 LBS | TEMPERATURE: 97.7 F | HEART RATE: 74 BPM | HEIGHT: 64 IN

## 2021-04-15 DIAGNOSIS — R06.09 DYSPNEA ON EXERTION: ICD-10-CM

## 2021-04-15 DIAGNOSIS — E78.00 HYPERCHOLESTEROLEMIA: ICD-10-CM

## 2021-04-15 DIAGNOSIS — I10 BENIGN ESSENTIAL HYPERTENSION: ICD-10-CM

## 2021-04-15 DIAGNOSIS — J43.8 OTHER EMPHYSEMA (HCC): ICD-10-CM

## 2021-04-15 PROCEDURE — 99213 OFFICE O/P EST LOW 20 MIN: CPT | Performed by: NURSE PRACTITIONER

## 2021-04-15 RX ORDER — MOMETASONE FUROATE AND FORMOTEROL FUMARATE DIHYDRATE 200; 5 UG/1; UG/1
2 AEROSOL RESPIRATORY (INHALATION) 2 TIMES DAILY
Qty: 13 G | Refills: 5 | Status: SHIPPED | OUTPATIENT
Start: 2021-04-15

## 2021-04-15 RX ORDER — ATORVASTATIN CALCIUM 40 MG/1
40 TABLET, FILM COATED ORAL DAILY
Qty: 90 TABLET | Refills: 1 | Status: SHIPPED | OUTPATIENT
Start: 2021-04-15

## 2021-04-15 RX ORDER — HYDROCHLOROTHIAZIDE 25 MG/1
25 TABLET ORAL DAILY
Qty: 90 TABLET | Refills: 1 | Status: SHIPPED | OUTPATIENT
Start: 2021-04-15

## 2021-04-15 RX ORDER — ALBUTEROL SULFATE 90 UG/1
2 AEROSOL, METERED RESPIRATORY (INHALATION) EVERY 4 HOURS PRN
Qty: 18 G | Refills: 5 | Status: SHIPPED | OUTPATIENT
Start: 2021-04-15

## 2021-04-15 NOTE — PROGRESS NOTES
Patient ID: Senait Moore is a 70 y.o. female     Patient Care Team:  Head, SURYA Carreon as PCP - General (Nurse Practitioner)  Radha Iglesias MD (Inactive) as Consulting Physician (Hematology and Oncology)  July Valdez MD as Referring Physician (Breast Surgery)    Subjective     Chief Complaint   Patient presents with   • Hypertension   • Hyperlipidemia       History of Present Illness    Senait Moore presents to [facility] today for continued management concerning hypertension, hyperlipidemia, and COPD.  Since she was last seen, she feels she has been doing well.  She has been under increased stress due to putting her house on the market with today with plans to move to [place] in the near future.  Also wearing the masks makes it difficult for her to breathe especially with her COPD.  She is received both COVID-19 vaccines..  Hypertension: Currently managed with hydrochlorothiazide 25 mg daily.  However she took her medication immediately leaving the house to come to this appointment which she was rushing to get to.  Hyperlipidemia: Managed with atorvastatin 40 mg daily.  COPD is managed with Dulera inhaler and as needed albuterol.  She has been waiting on repeat colonoscopy, mammogram, and bone density scan until the current pandemic is over with.  Plans to have testing completed once masks are no longer required in the hospital.    Health Habits:  Dental Exam: Up to date  Eye Exam: Up to date  Diet: Limits carb intake   Exercise: 3 times/week.  Current exercise activities include: walking  Pap/PSA:  Mammogram: 12/31/2020 right  And negative  Dexa: 6/6/2017 osteopenia  Colonoscopy: Due 2020      She denies any complaints of fever, chills, cough, chest pain, shortness of air, abdominal pain, nausea, or any other concerns.     The following portions of the patient's history were reviewed and updated as appropriate: allergies, current medications, past  family history, past medical history, past social history, past surgical history and problem list.       Review of Systems   Constitutional: Negative.   HENT: Negative.    Eyes: Negative.    Cardiovascular: Negative.    Respiratory: Negative.    Endocrine: Negative.    Hematologic/Lymphatic: Negative.    Skin: Negative.    Musculoskeletal: Negative.    Gastrointestinal: Negative.    Genitourinary: Negative.    Neurological: Negative.    Psychiatric/Behavioral: Negative.         Vitals:    04/15/21 1135   BP: 160/90   Pulse: 74   Resp: 16   Temp: 97.7 °F (36.5 °C)   SpO2: 93%       Documented weights    04/15/21 1135   Weight: 79.8 kg (176 lb)     Body mass index is 30.69 kg/m².    Results for orders placed or performed in visit on 04/02/21   Hemoglobin A1c    Specimen: Blood   Result Value Ref Range    Hemoglobin A1C 6.30 (H) 4.80 - 5.60 %   Lipid Panel    Specimen: Blood   Result Value Ref Range    Total Cholesterol 213 (H) 0 - 200 mg/dL    Triglycerides 170 (H) 0 - 150 mg/dL    HDL Cholesterol 50 40 - 60 mg/dL    VLDL Cholesterol Chintan 30 5 - 40 mg/dL    LDL Chol Calc (NIH) 133 (H) 0 - 100 mg/dL   Comprehensive Metabolic Panel    Specimen: Blood   Result Value Ref Range    Glucose 105 (H) 65 - 99 mg/dL    BUN 20 8 - 23 mg/dL    Creatinine 0.87 0.57 - 1.00 mg/dL    eGFR Non African Am 65 >60 mL/min/1.73    eGFR African Am 78 >60 mL/min/1.73    BUN/Creatinine Ratio 23.0 7.0 - 25.0    Sodium 143 136 - 145 mmol/L    Potassium 4.2 3.5 - 5.2 mmol/L    Chloride 102 98 - 107 mmol/L    Total CO2 30.8 (H) 22.0 - 29.0 mmol/L    Calcium 9.9 8.6 - 10.5 mg/dL    Total Protein 7.1 6.0 - 8.5 g/dL    Albumin 4.70 3.50 - 5.20 g/dL    Globulin 2.4 gm/dL    A/G Ratio 2.0 g/dL    Total Bilirubin 0.5 0.0 - 1.2 mg/dL    Alkaline Phosphatase 90 39 - 117 U/L    AST (SGOT) 18 1 - 32 U/L    ALT (SGPT) 22 1 - 33 U/L   CBC & Differential    Specimen: Blood   Result Value Ref Range    WBC 7.29 3.40 - 10.80 10*3/mm3    RBC 5.21 3.77 - 5.28  10*6/mm3    Hemoglobin 15.3 12.0 - 15.9 g/dL    Hematocrit 45.0 34.0 - 46.6 %    MCV 86.4 79.0 - 97.0 fL    MCH 29.4 26.6 - 33.0 pg    MCHC 34.0 31.5 - 35.7 g/dL    RDW 13.7 12.3 - 15.4 %    Platelets 292 140 - 450 10*3/mm3    Neutrophil Rel % 59.4 42.7 - 76.0 %    Lymphocyte Rel % 28.9 19.6 - 45.3 %    Monocyte Rel % 7.4 5.0 - 12.0 %    Eosinophil Rel % 3.3 0.3 - 6.2 %    Basophil Rel % 0.7 0.0 - 1.5 %    Neutrophils Absolute 4.33 1.70 - 7.00 10*3/mm3    Lymphocytes Absolute 2.11 0.70 - 3.10 10*3/mm3    Monocytes Absolute 0.54 0.10 - 0.90 10*3/mm3    Eosinophils Absolute 0.24 0.00 - 0.40 10*3/mm3    Basophils Absolute 0.05 0.00 - 0.20 10*3/mm3    Immature Granulocyte Rel % 0.3 0.0 - 0.5 %    Immature Grans Absolute 0.02 0.00 - 0.05 10*3/mm3    nRBC 0.0 0.0 - 0.2 /100 WBC           Objective     Physical Exam  Constitutional:       Appearance: She is well-developed.   HENT:      Head: Normocephalic and atraumatic.      Right Ear: Tympanic membrane normal.      Left Ear: Tympanic membrane normal.   Eyes:      Pupils: Pupils are equal, round, and reactive to light.   Cardiovascular:      Rate and Rhythm: Normal rate and regular rhythm.      Heart sounds: Normal heart sounds. No murmur heard.     Pulmonary:      Effort: Pulmonary effort is normal.      Breath sounds: Normal breath sounds. No wheezing, rhonchi or rales.   Abdominal:      General: Bowel sounds are normal.      Palpations: Abdomen is soft.      Tenderness: There is no abdominal tenderness.   Musculoskeletal:         General: No tenderness. Normal range of motion.      Cervical back: Normal range of motion and neck supple.   Skin:     General: Skin is warm and dry.      Findings: No erythema or rash.   Neurological:      Mental Status: She is alert and oriented to person, place, and time.   Psychiatric:         Behavior: Behavior normal.            Assessment/Plan     Assessment/Plan     Diagnoses and all orders for this visit:    1. Other emphysema  (CMS/[facility])  -     mometasone-formoterol (Dulera) 200-5 MCG/ACT inhaler; Inhale 2 puffs 2 (Two) Times a Day.  Dispense: 13 g; Refill: 5    2. Dyspnea on exertion  -     albuterol sulfate  (90 Base) MCG/ACT inhaler; Inhale 2 puffs Every 4 (Four) Hours As Needed for Wheezing or Shortness of Air.  Dispense: 18 g; Refill: 5    3. Hypercholesterolemia  -     atorvastatin (LIPITOR) 40 MG tablet; Take 1 tablet by mouth Daily.  Dispense: 90 tablet; Refill: 1    4. Benign essential hypertension  -     hydroCHLOROthiazide (HYDRODIURIL) 25 MG tablet; Take 1 tablet by mouth Daily.  Dispense: 90 tablet; Refill: 1          Summary:  Senait Moore has been doing well since she was last seen except for increased stressors due to family house and moving out of state.  Blood pressure is elevated in office today at 160/90.  Rechecked after sitting in the exam room for approximately 10 minutes and stay at 162/84.  During previous office visit her blood pressure was elevated however she had not taken her medications the morning before her appointment.  Had her return for blood pressure recheck with medical assistant and B/P was stable at that time.  No changes in medications at this time.  Instructed to continue to monitor blood pressure at home and notify us if stays greater than 140/90.  Also instructed to return the office in approximately 1 to 2 weeks in the afternoon after taking her medication for another blood pressure recheck with the medical assistant.  Reviewed recent labs along with patient which all appear stable except for cholesterol levels remain elevated.  She has been trying to watch her diet by limiting her carb intake.  Currently taking atorvastatin 40 mg daily.  Recommend increasing dosage however she already suffers from leg cramping which she manages with potassium and magnesium supplement.  She wishes to hold off for now especially due to moving out of state.  Instruct continue to watch her diet take  medication as directed.  Needs to follow-up with new primary care provider in [place] when able.  Copy of labs also sent with patient to take with her.  Advised if still in town, she needs return to office in 6 months for next recheck appointment with fasting labs and AWV.  She also needs scheduled for repeat colonoscopy, mammogram, and bone density scan.  However she continues to want to hold off for now until mask on no longer needed in the hospital.  In the meantime, instructed to contact us sooner for any problems or concerns.     Follow Up:  Return in about 6 months (around 10/15/2021) for Medicare Wellness.    Patient was given instructions and counseling regarding condition or for health maintenance advice.  Please see specific information pulled into the AVS if appropriate.      Patient was wearing facemask when I entered the room and throughout our encounter. Protective equipment was worn throughout this patient encounter including a face mask, eye protection,  and gloves. Hand hygiene was performed before donning protective equipment and after removal when leaving the room.     Maria Elena Arechiga, APRN  Family Medicine  [facility]  04/15/21  12:55 EDT

## 2022-02-18 ENCOUNTER — TELEPHONE (OUTPATIENT)
Dept: FAMILY MEDICINE CLINIC | Facility: CLINIC | Age: 72
End: 2022-02-18

## 2022-02-18 NOTE — TELEPHONE ENCOUNTER
Caller: Senait Moore    Relationship: Self    Best call back number: [phone]    What form or medical record are you requesting: IMAGING AND REPORT FROM MAMMOGRAM FROM December     Who is requesting this form or medical record from you: PATIENT     How would you like to receive the form or medical records (pick-up, mail, fax): MAIL TO PATIENT   [address] [place] [address]      Timeframe paperwork needed: ASAP     Additional notes:  DOCTOR PATIENT IS SEEING IN [place] IS REQUESTING CD OR DVD  OF  IMAGING AND REPORT   SO DOCTOR COULD VIEW .